# Patient Record
Sex: MALE | Race: ASIAN | NOT HISPANIC OR LATINO
[De-identification: names, ages, dates, MRNs, and addresses within clinical notes are randomized per-mention and may not be internally consistent; named-entity substitution may affect disease eponyms.]

---

## 2022-09-22 PROBLEM — Z00.00 ENCOUNTER FOR PREVENTIVE HEALTH EXAMINATION: Status: ACTIVE | Noted: 2022-09-22

## 2022-11-14 ENCOUNTER — APPOINTMENT (OUTPATIENT)
Dept: ORTHOPEDIC SURGERY | Facility: CLINIC | Age: 72
End: 2022-11-14

## 2022-11-14 VITALS — BODY MASS INDEX: 31.07 KG/M2 | WEIGHT: 205 LBS | HEIGHT: 68 IN

## 2022-11-14 DIAGNOSIS — Z86.39 PERSONAL HISTORY OF OTHER ENDOCRINE, NUTRITIONAL AND METABOLIC DISEASE: ICD-10-CM

## 2022-11-14 DIAGNOSIS — Z87.898 PERSONAL HISTORY OF OTHER SPECIFIED CONDITIONS: ICD-10-CM

## 2022-11-14 DIAGNOSIS — Z86.79 PERSONAL HISTORY OF OTHER DISEASES OF THE CIRCULATORY SYSTEM: ICD-10-CM

## 2022-11-14 DIAGNOSIS — Z87.891 PERSONAL HISTORY OF NICOTINE DEPENDENCE: ICD-10-CM

## 2022-11-14 DIAGNOSIS — Z95.5 PRESENCE OF CORONARY ANGIOPLASTY IMPLANT AND GRAFT: ICD-10-CM

## 2022-11-14 PROCEDURE — 20611 DRAIN/INJ JOINT/BURSA W/US: CPT | Mod: 50

## 2022-11-14 PROCEDURE — 99214 OFFICE O/P EST MOD 30 MIN: CPT | Mod: 25

## 2022-11-14 PROCEDURE — 73562 X-RAY EXAM OF KNEE 3: CPT | Mod: 50

## 2022-11-14 NOTE — ASSESSMENT
[FreeTextEntry1] : 9/15/20:Has advanced OA on the left knee and moderate OA on the left. Has had HA injections in the past with significant relief. Very active with home exercises at home- would prefer not to take oral medication. Patient elected to begin bilateral Orthovisc injections today. Tolerated well.\par 9/22/20: Inj tolerated well.\par 9/28/20: Inj tolerated well.\par 10/6/20: Inj tolerated well.\par \par 11/14/22: Will restart orthovisc injections as this has given him significant relief in the past. He will continue HEP as this has been beneficial for him.

## 2022-11-14 NOTE — PROCEDURE
[Large Joint Injection] : Large joint injection [Bilateral] : bilaterally of the [Knee] : knee [Pain] : pain [Inflammation] : inflammation [X-ray evidence of Osteoarthritis on this or prior visit] : x-ray evidence of Osteoarthritis on this or prior visit [Repeat series performed] : repeat series performed [Alcohol] : alcohol [Betadine] : betadine [Ethyl Chloride sprayed topically] : ethyl chloride sprayed topically [Sterile technique used] : sterile technique used [Orthovisc] : Orthovisc [] : Patient tolerated procedure well [Call if redness, pain or fever occur] : call if redness, pain or fever occur [Apply ice for 15min out of every hour for the next 12-24 hours as tolerated] : apply ice for 15 minutes out of every hour for the next 12-24 hours as tolerated [Patient was advised to rest the joint(s) for ____ days] : patient was advised to rest the joint(s) for [unfilled] days [Previous OTC use and PT nontherapeutic] : patient has tried OTC's including aspirin, Ibuprofen, Aleve, etc or prescription NSAIDS, and/or exercises at home and/or physical therapy without satisfactory response [Patient had decreased mobility in the joint] : patient had decreased mobility in the joint [Risks, benefits, alternatives discussed / Verbal consent obtained] : the risks benefits, and alternatives have been discussed, and verbal consent was obtained [All ultrasound images have been permanently captured and stored accordingly in our picture archiving and communication system] : All ultrasound images have been permanently captured and stored accordingly in our picture archiving and communication system [#1] : series #1

## 2022-11-14 NOTE — IMAGING
[Bilateral] : knee bilaterally [AP] : anteroposterior [Lateral] : lateral [[place]] : [place] [de-identified] : right knee:\par 0-130 \par varus \par non tenderness\par no crepitus\par no effusion \par 5/5\par NVI\par \par left knee:\par 0-125 \par varus \par moderate effusion \par mild medial joint line tenderness\par crepitus\par NVI\par 5/5 [FreeTextEntry9] : mod - adv OA

## 2022-11-14 NOTE — HISTORY OF PRESENT ILLNESS
[8] : 8 [5] : 5 [Retired] : Work status: retired [de-identified] : 11/14/22: Continued pain in b/l knees- he has gotten about a year relief from orthovisc injections.\par \par Previous doc:\par 9/15/20: Mr. Kilo Aaron, a 70-year-old male, presents today for many years of bilateral knee pain and clicking that has progressively worsened this past year. Right worse then left. Had cortisone and gel injections years ago with significant relief. Pain limits his daily activities. Takes NSAIDs.\par 9/22/20: Orthovisc #2 b/l knees.\par 9/28/20: Orthovisc #3 b/l knees, feeling better.\par 10/6/20: Orthovisc #4 b/l knees, improving.\par  [FreeTextEntry1] : B/L KNEES [de-identified] : ADVIL

## 2022-11-14 NOTE — DISCUSSION/SUMMARY
[de-identified] : The natural progression of Osteoarthritis was explained to the patient.  We discussed the possible treatment options from conservative to operative.  These included NSAIDS, Glucosamine and Chondrotin sulfate, and Physical Therapy as well different types of injections.  We also discussed that at some point they may progress to needed a TKA.  Information and pamphlets were given when appropriate.\par \par Entered by Sera Samayoa acting as scribe.\par

## 2022-11-29 ENCOUNTER — APPOINTMENT (OUTPATIENT)
Dept: ORTHOPEDIC SURGERY | Facility: CLINIC | Age: 72
End: 2022-11-29

## 2022-11-29 VITALS — WEIGHT: 205 LBS | BODY MASS INDEX: 31.07 KG/M2 | HEIGHT: 68 IN

## 2022-11-29 PROCEDURE — 20610 DRAIN/INJ JOINT/BURSA W/O US: CPT

## 2022-11-29 NOTE — IMAGING
[de-identified] : right knee:\par 0-130 \par varus \par non tenderness\par no crepitus\par no effusion \par 5/5\par NVI\par \par left knee:\par 0-125 \par varus \par moderate effusion \par mild medial joint line tenderness\par crepitus\par NVI\par 5/5

## 2022-11-29 NOTE — ASSESSMENT
[FreeTextEntry1] : 9/15/20:Has advanced OA on the left knee and moderate OA on the left. Has had HA injections in the past with significant relief. Very active with home exercises at home- would prefer not to take oral medication. Patient elected to begin bilateral Orthovisc injections today. Tolerated well.\par 9/22/20: Inj tolerated well.\par 9/28/20: Inj tolerated well.\par 10/6/20: Inj tolerated well.\par 11/14/22: Will restart orthovisc injections as this has given him significant relief in the past. He will continue HEP as this has been beneficial for him. \par \par 11/29/22: Inj tolerated well.

## 2022-11-29 NOTE — HISTORY OF PRESENT ILLNESS
[5] : 5 [Intermittent] : intermittent [2] : 2 [Orthovisc] : Orthovisc [de-identified] : 11/29/22: Orthovisc #2 b/l knees.\par \par Previous doc:\par 9/15/20: Mr. Kilo Aaron, a 70-year-old male, presents today for many years of bilateral knee pain and clicking that has progressively worsened this past year. Right worse then left. Had cortisone and gel injections years ago with significant relief. Pain limits his daily activities. Takes NSAIDs.\par 9/22/20: Orthovisc #2 b/l knees.\par 9/28/20: Orthovisc #3 b/l knees, feeling better.\par 10/6/20: Orthovisc #4 b/l knees, improving.\par 11/14/22: Continued pain in b/l knees- he has gotten about a year relief from orthovisc injections. [de-identified] : waqas knees [TWNoteComboBox1] : 10%

## 2022-12-06 ENCOUNTER — APPOINTMENT (OUTPATIENT)
Dept: ORTHOPEDIC SURGERY | Facility: CLINIC | Age: 72
End: 2022-12-06

## 2022-12-06 VITALS — HEIGHT: 68 IN | WEIGHT: 205 LBS | BODY MASS INDEX: 31.07 KG/M2

## 2022-12-06 PROCEDURE — 20610 DRAIN/INJ JOINT/BURSA W/O US: CPT | Mod: 50

## 2022-12-06 NOTE — HISTORY OF PRESENT ILLNESS
[5] : 5 [2] : 2 [Dull/Aching] : dull/aching [Constant] : constant [Orthovisc] : Orthovisc [de-identified] : 12/6/22: Orthovisc #3 b/l knees.\par \par Previous doc:\par 9/15/20: Mr. Kilo Aaron, a 70-year-old male, presents today for many years of bilateral knee pain and clicking that has progressively worsened this past year. Right worse then left. Had cortisone and gel injections years ago with significant relief. Pain limits his daily activities. Takes NSAIDs.\par 9/22/20: Orthovisc #2 b/l knees.\par 9/28/20: Orthovisc #3 b/l knees, feeling better.\par 10/6/20: Orthovisc #4 b/l knees, improving.\par 11/14/22: Continued pain in b/l knees- he has gotten about a year relief from orthovisc injections.\par 11/29/22: Orthovisc #2 b/l knees.\par  [] : no [FreeTextEntry1] : DORON knees [FreeTextEntry5] : ANA LUISA DURÁN is a 72 year old M here for INJ #3 of Orthovisc for DORON knee pain.  [de-identified] : 11/29/22 [de-identified] : DORON knees [TWNoteComboBox1] : 50%

## 2022-12-06 NOTE — PROCEDURE
[Large Joint Injection] : Large joint injection [Bilateral] : bilaterally of the [Knee] : knee [Pain] : pain [Inflammation] : inflammation [X-ray evidence of Osteoarthritis on this or prior visit] : x-ray evidence of Osteoarthritis on this or prior visit [Repeat series performed] : repeat series performed [Alcohol] : alcohol [Betadine] : betadine [Ethyl Chloride sprayed topically] : ethyl chloride sprayed topically [Sterile technique used] : sterile technique used [Orthovisc (30mg)] : 30mg of Orthovisc [#3] : series #3 [] : Patient tolerated procedure well [Call if redness, pain or fever occur] : call if redness, pain or fever occur [Apply ice for 15min out of every hour for the next 12-24 hours as tolerated] : apply ice for 15 minutes out of every hour for the next 12-24 hours as tolerated [Previous OTC use and PT nontherapeutic] : patient has tried OTC's including aspirin, Ibuprofen, Aleve, etc or prescription NSAIDS, and/or exercises at home and/or physical therapy without satisfactory response [Patient had decreased mobility in the joint] : patient had decreased mobility in the joint [Risks, benefits, alternatives discussed / Verbal consent obtained] : the risks benefits, and alternatives have been discussed, and verbal consent was obtained [All ultrasound images have been permanently captured and stored accordingly in our picture archiving and communication system] : All ultrasound images have been permanently captured and stored accordingly in our picture archiving and communication system [Visualization of the needle and placement of injection was performed without complication] : visualization of the needle and placement of injection was performed without complication

## 2022-12-06 NOTE — IMAGING
[de-identified] : right knee:\par 0-130 \par varus \par non tenderness\par no crepitus\par no effusion \par 5/5\par NVI\par \par left knee:\par 0-125 \par varus \par moderate effusion \par mild medial joint line tenderness\par crepitus\par NVI\par 5/5

## 2022-12-06 NOTE — ASSESSMENT
[FreeTextEntry1] : 9/15/20:Has advanced OA on the left knee and moderate OA on the left. Has had HA injections in the past with significant relief. Very active with home exercises at home- would prefer not to take oral medication. Patient elected to begin bilateral Orthovisc injections today. Tolerated well.\par 9/22/20: Inj tolerated well.\par 9/28/20: Inj tolerated well.\par 10/6/20: Inj tolerated well.\par 11/14/22: Will restart orthovisc injections as this has given him significant relief in the past. He will continue HEP as this has been beneficial for him. \par 11/29/22: Inj tolerated well.\par \par 12/6/22: Inj tolerated well.

## 2022-12-16 ENCOUNTER — APPOINTMENT (OUTPATIENT)
Dept: ORTHOPEDIC SURGERY | Facility: CLINIC | Age: 72
End: 2022-12-16
Payer: MEDICARE

## 2022-12-16 VITALS — BODY MASS INDEX: 31.22 KG/M2 | WEIGHT: 206 LBS | HEIGHT: 68 IN

## 2022-12-16 PROCEDURE — 20610 DRAIN/INJ JOINT/BURSA W/O US: CPT | Mod: 50

## 2022-12-16 NOTE — ASSESSMENT
[FreeTextEntry1] : 9/15/20:Has advanced OA on the left knee and moderate OA on the left. Has had HA injections in the past with significant relief. Very active with home exercises at home- would prefer not to take oral medication. Patient elected to begin bilateral Orthovisc injections today. Tolerated well.\par 9/22/20: Inj tolerated well.\par 9/28/20: Inj tolerated well.\par 10/6/20: Inj tolerated well.\par 11/14/22: Will restart orthovisc injections as this has given him significant relief in the past. He will continue HEP as this has been beneficial for him. \par 11/29/22: Inj tolerated well.\par 12/6/22: Inj tolerated well. \par \par 12/16/22: Inj tolerated well.

## 2022-12-16 NOTE — HISTORY OF PRESENT ILLNESS
[4] : 4 [Orthovisc] : Orthovisc [de-identified] : 12/16/22: Orthovisc #4 b/l knees.\par \par Previous doc:\par 9/15/20: Mr. Kilo Aaron, a 70-year-old male, presents today for many years of bilateral knee pain and clicking that has progressively worsened this past year. Right worse then left. Had cortisone and gel injections years ago with significant relief. Pain limits his daily activities. Takes NSAIDs.\par 9/22/20: Orthovisc #2 b/l knees.\par 9/28/20: Orthovisc #3 b/l knees, feeling better.\par 10/6/20: Orthovisc #4 b/l knees, improving.\par 11/14/22: Continued pain in b/l knees- he has gotten about a year relief from orthovisc injections.\par 11/29/22: Orthovisc #2 b/l knees.\par 12/6/22: Orthovisc #3 b/l knees. [de-identified] : 12/6/22 [de-identified] : waqas knees  [de-identified] : Orthovisc

## 2022-12-16 NOTE — PROCEDURE
[Large Joint Injection] : Large joint injection [Bilateral] : bilaterally of the [Knee] : knee [Pain] : pain [Inflammation] : inflammation [X-ray evidence of Osteoarthritis on this or prior visit] : x-ray evidence of Osteoarthritis on this or prior visit [Repeat series performed] : repeat series performed [Alcohol] : alcohol [Betadine] : betadine [Ethyl Chloride sprayed topically] : ethyl chloride sprayed topically [Sterile technique used] : sterile technique used [Orthovisc (30mg)] : 30mg of Orthovisc [#4] : series #4 [] : Patient tolerated procedure well [Call if redness, pain or fever occur] : call if redness, pain or fever occur [Apply ice for 15min out of every hour for the next 12-24 hours as tolerated] : apply ice for 15 minutes out of every hour for the next 12-24 hours as tolerated [Previous OTC use and PT nontherapeutic] : patient has tried OTC's including aspirin, Ibuprofen, Aleve, etc or prescription NSAIDS, and/or exercises at home and/or physical therapy without satisfactory response [Patient had decreased mobility in the joint] : patient had decreased mobility in the joint [Risks, benefits, alternatives discussed / Verbal consent obtained] : the risks benefits, and alternatives have been discussed, and verbal consent was obtained [All ultrasound images have been permanently captured and stored accordingly in our picture archiving and communication system] : All ultrasound images have been permanently captured and stored accordingly in our picture archiving and communication system [Visualization of the needle and placement of injection was performed without complication] : visualization of the needle and placement of injection was performed without complication

## 2022-12-16 NOTE — IMAGING
[de-identified] : right knee:\par 0-130 \par varus \par non tenderness\par no crepitus\par no effusion \par 5/5\par NVI\par \par left knee:\par 0-125 \par varus \par moderate effusion \par mild medial joint line tenderness\par crepitus\par NVI\par 5/5

## 2023-05-23 ENCOUNTER — APPOINTMENT (OUTPATIENT)
Dept: ORTHOPEDIC SURGERY | Facility: CLINIC | Age: 73
End: 2023-05-23
Payer: MEDICARE

## 2023-05-23 VITALS — HEIGHT: 68 IN | WEIGHT: 208 LBS | BODY MASS INDEX: 31.52 KG/M2

## 2023-05-23 PROCEDURE — 99214 OFFICE O/P EST MOD 30 MIN: CPT | Mod: 25

## 2023-05-23 PROCEDURE — 20611 DRAIN/INJ JOINT/BURSA W/US: CPT | Mod: 50

## 2023-06-02 NOTE — DISCUSSION/SUMMARY
[de-identified] : The patient was advised of the diagnosis.  The natural history of the pathology was explained in full to the patient in layman's terms. All questions were answered.  The risks and benefits of surgical and non-surgical treatment alternatives were explained in full to the patient.\par \par The natural progression of Osteoarthritis was explained to the patient.  We discussed the possible treatment options from conservative to operative.  These included NSAIDS, Glucosamine and Chondrotin sulfate, and Physical Therapy as well different types of injections.  We also discussed that at some point they may progress to needed a TKA.  Information and pamphlets were given.\par \par The risks, benefits, contents and alternatives to injection were explained in full to the patient.  Risks outlined include but are not limited to infection, sepsis, bleeding, scarring, skin discoloration, temporary increase in pain, syncopal episode, failure to resolve symptoms, allergic reaction, flare reaction, permanent white skin discoloration, symptom recurrence, and elevation of blood sugar in diabetics.  Patient understood the risks.  All questions were answered.  After discussion of options, patient requested an injection.  Oral informed consent was obtained and sterile prep was done of the injection site.  Sterile technique was used to introduce the mixture.  Patient tolerated the procedure well.  Patient advised to ice the injection site this evening.  Signs and symptoms of infection reviewed and patient advised to call immediately for redness, fevers, and/or chills. \par \par Progress note completed by Ashley Araujo PA-C

## 2023-06-02 NOTE — IMAGING
[de-identified] : right knee:\par 0-130 \par varus \par non tenderness\par no crepitus\par no effusion \par 5/5\par NVI\par \par left knee:\par 0-125 \par varus \par moderate effusion \par mild medial joint line tenderness\par crepitus\par NVI\par 5/5\par ambulates with cane

## 2023-06-02 NOTE — ASSESSMENT
[FreeTextEntry1] : 9/15/20:Has advanced OA on the left knee and moderate OA on the left. Has had HA injections in the past with significant relief. Very active with home exercises at home- would prefer not to take oral medication. Patient elected to begin bilateral Orthovisc injections today. Tolerated well.\par 9/22/20: Inj tolerated well.\par 9/28/20: Inj tolerated well.\par 10/6/20: Inj tolerated well.\par 11/14/22: Will restart orthovisc injections as this has given him significant relief in the past. He will continue HEP as this has been beneficial for him. \par 11/29/22: Inj tolerated well.\par 12/6/22: Inj tolerated well. \par 12/16/22: Inj tolerated well.\par \par 5/23/23: Zilretta bilateral knees today, tolerated well. 10cc yellow clear fluid aspirated R knee. f/up in 6 weeks, consider repeating visco inj vs. TKA discussion.

## 2023-06-02 NOTE — HISTORY OF PRESENT ILLNESS
[8] : 8 [6] : 6 [Dull/Aching] : dull/aching [de-identified] : 5/23/23: Here for f/up bilateral knee. Visco series finished at last visit had been beneficial for a few months. R knee pain and stiffness worsened last month\par \par Previous doc:\par 9/15/20: Mr. Kilo Aaron, a 70-year-old male, presents today for many years of bilateral knee pain and clicking that has progressively worsened this past year. Right worse then left. Had cortisone and gel injections years ago with significant relief. Pain limits his daily activities. Takes NSAIDs.\par 9/22/20: Orthovisc #2 b/l knees.\par 9/28/20: Orthovisc #3 b/l knees, feeling better.\par 10/6/20: Orthovisc #4 b/l knees, improving.\par 11/14/22: Continued pain in b/l knees- he has gotten about a year relief from orthovisc injections.\par 11/29/22: Orthovisc #2 b/l knees.\par 12/6/22: Orthovisc #3 b/l knees.\par 12/16/22: Orthovisc #4 b/l knees.

## 2023-06-02 NOTE — PROCEDURE
[Large Joint Injection] : Large joint injection [Bilateral] : bilaterally of the [Knee] : knee [Pain] : pain [Inflammation] : inflammation [X-ray evidence of Osteoarthritis on this or prior visit] : x-ray evidence of Osteoarthritis on this or prior visit [Alcohol] : alcohol [Betadine] : betadine [Ethyl Chloride sprayed topically] : ethyl chloride sprayed topically [Sterile technique used] : sterile technique used [___ cc    1%] : Lidocaine ~Vcc of 1%  [___ cc    32 units 5mg] : Zilretta ~Vcc of 32 units 5 mg  [Effusion] : effusion [] : Patient tolerated procedure well [Call if redness, pain or fever occur] : call if redness, pain or fever occur [Apply ice for 15min out of every hour for the next 12-24 hours as tolerated] : apply ice for 15 minutes out of every hour for the next 12-24 hours as tolerated [Patient was advised to rest the joint(s) for ____ days] : patient was advised to rest the joint(s) for [unfilled] days [Previous OTC use and PT nontherapeutic] : patient has tried OTC's including aspirin, Ibuprofen, Aleve, etc or prescription NSAIDS, and/or exercises at home and/or physical therapy without satisfactory response [Risks, benefits, alternatives discussed / Verbal consent obtained] : the risks benefits, and alternatives have been discussed, and verbal consent was obtained [Prior failure or difficult injection] : prior failure or difficult injection [All ultrasound images have been permanently captured and stored accordingly in our picture archiving and communication system] : All ultrasound images have been permanently captured and stored accordingly in our picture archiving and communication system [Visualization of the needle and placement of injection was performed without complication] : visualization of the needle and placement of injection was performed without complication [de-identified] : 10cc R knee [de-identified] : clear yellow

## 2023-06-27 ENCOUNTER — APPOINTMENT (OUTPATIENT)
Dept: ORTHOPEDIC SURGERY | Facility: CLINIC | Age: 73
End: 2023-06-27
Payer: MEDICARE

## 2023-06-27 VITALS — HEIGHT: 68 IN | WEIGHT: 208 LBS | BODY MASS INDEX: 31.52 KG/M2

## 2023-06-27 PROCEDURE — 99213 OFFICE O/P EST LOW 20 MIN: CPT

## 2023-06-27 NOTE — DISCUSSION/SUMMARY
[de-identified] : The patient was advised of the diagnosis.  The natural history of the pathology was explained in full to the patient in layman's terms. All questions were answered.  The risks and benefits of surgical and non-surgical treatment alternatives were explained in full to the patient.\par \par The natural progression of Osteoarthritis was explained to the patient.  We discussed the possible treatment options from conservative to operative.  These included NSAIDS, Glucosamine and Chondrotin sulfate, and Physical Therapy as well different types of injections.  We also discussed that at some point they may progress to needed a TKA.  Information and pamphlets were given.\par \par The risks, benefits, contents and alternatives to injection were explained in full to the patient.  Risks outlined include but are not limited to infection, sepsis, bleeding, scarring, skin discoloration, temporary increase in pain, syncopal episode, failure to resolve symptoms, allergic reaction, flare reaction, permanent white skin discoloration, symptom recurrence, and elevation of blood sugar in diabetics.  Patient understood the risks.  All questions were answered.  After discussion of options, patient requested an injection.  Oral informed consent was obtained and sterile prep was done of the injection site.  Sterile technique was used to introduce the mixture.  Patient tolerated the procedure well.  Patient advised to ice the injection site this evening.  Signs and symptoms of infection reviewed and patient advised to call immediately for redness, fevers, and/or chills. \par \par Progress note completed by Ashley Araujo PA-C

## 2023-06-27 NOTE — HISTORY OF PRESENT ILLNESS
[8] : 8 [6] : 6 [Dull/Aching] : dull/aching [de-identified] : 6/27/23 inj helped a lot - sig improvement - still has some pain - hesitant to do PT due to pain  \par \par Previous doc:\par 9/15/20: Mr. Kilo Aaron, a 70-year-old male, presents today for many years of bilateral knee pain and clicking that has progressively worsened this past year. Right worse then left. Had cortisone and gel injections years ago with significant relief. Pain limits his daily activities. Takes NSAIDs.\par 9/22/20: Orthovisc #2 b/l knees.\par 9/28/20: Orthovisc #3 b/l knees, feeling better.\par 10/6/20: Orthovisc #4 b/l knees, improving.\par 11/14/22: Continued pain in b/l knees- he has gotten about a year relief from orthovisc injections.\par 11/29/22: Orthovisc #2 b/l knees.\par 12/6/22: Orthovisc #3 b/l knees.\par 12/16/22: Orthovisc #4 b/l knees.\par 5/23/23: Here for f/up bilateral knee. Visco series finished at last visit had been beneficial for a few months. R knee pain and stiffness worsened last month\par  [FreeTextEntry5] : Kilo is a 73 year M, here for a FU on B/L knee. Little achy. Had a CSI Injection.

## 2023-06-27 NOTE — IMAGING
[de-identified] : right knee:\par 0-130 \par varus \par non tenderness\par no crepitus\par no effusion \par 5/5\par NVI\par \par left knee:\par 0-125 \par varus \par no effusion \par mild medial joint line tenderness\par crepitus\par NVI\par 5/5\par ambulates with cane

## 2023-06-27 NOTE — ASSESSMENT
[FreeTextEntry1] : 9/15/20:Has advanced OA on the left knee and moderate OA on the left. Has had HA injections in the past with significant relief. Very active with home exercises at home- would prefer not to take oral medication. Patient elected to begin bilateral Orthovisc injections today. Tolerated well.\par 9/22/20: Inj tolerated well.\par 9/28/20: Inj tolerated well.\par 10/6/20: Inj tolerated well.\par 11/14/22: Will restart orthovisc injections as this has given him significant relief in the past. He will continue HEP as this has been beneficial for him. \par 11/29/22: Inj tolerated well.\par 12/6/22: Inj tolerated well. \par 12/16/22: Inj tolerated well.\par 5/23/23: Zilretta bilateral knees today, tolerated well. 10cc yellow clear fluid aspirated R knee. f/up in 6 weeks, consider repeating visco inj vs. TKA discussion. \par \par 6/27 doing well min pain -happy with inj -

## 2023-06-27 NOTE — PROCEDURE
[Large Joint Injection] : Large joint injection [Bilateral] : bilaterally of the [Knee] : knee [Pain] : pain [Inflammation] : inflammation [X-ray evidence of Osteoarthritis on this or prior visit] : x-ray evidence of Osteoarthritis on this or prior visit [Alcohol] : alcohol [Betadine] : betadine [Ethyl Chloride sprayed topically] : ethyl chloride sprayed topically [Sterile technique used] : sterile technique used [___ cc    1%] : Lidocaine ~Vcc of 1%  [___ cc    32 units 5mg] : Zilretta ~Vcc of 32 units 5 mg  [Effusion] : effusion [] : Patient tolerated procedure well [Call if redness, pain or fever occur] : call if redness, pain or fever occur [Apply ice for 15min out of every hour for the next 12-24 hours as tolerated] : apply ice for 15 minutes out of every hour for the next 12-24 hours as tolerated [Patient was advised to rest the joint(s) for ____ days] : patient was advised to rest the joint(s) for [unfilled] days [Previous OTC use and PT nontherapeutic] : patient has tried OTC's including aspirin, Ibuprofen, Aleve, etc or prescription NSAIDS, and/or exercises at home and/or physical therapy without satisfactory response [Risks, benefits, alternatives discussed / Verbal consent obtained] : the risks benefits, and alternatives have been discussed, and verbal consent was obtained [Prior failure or difficult injection] : prior failure or difficult injection [All ultrasound images have been permanently captured and stored accordingly in our picture archiving and communication system] : All ultrasound images have been permanently captured and stored accordingly in our picture archiving and communication system [Visualization of the needle and placement of injection was performed without complication] : visualization of the needle and placement of injection was performed without complication [de-identified] : 10cc R knee [de-identified] : clear yellow

## 2023-09-05 ENCOUNTER — APPOINTMENT (OUTPATIENT)
Dept: ORTHOPEDIC SURGERY | Facility: CLINIC | Age: 73
End: 2023-09-05
Payer: MEDICARE

## 2023-09-05 VITALS — BODY MASS INDEX: 30.62 KG/M2 | HEIGHT: 68 IN | WEIGHT: 202 LBS

## 2023-09-05 PROCEDURE — 99214 OFFICE O/P EST MOD 30 MIN: CPT | Mod: 25

## 2023-09-05 PROCEDURE — 20611 DRAIN/INJ JOINT/BURSA W/US: CPT | Mod: RT

## 2023-09-05 NOTE — ASSESSMENT
[FreeTextEntry1] : 9/15/20:Has advanced OA on the left knee and moderate OA on the left. Has had HA injections in the past with significant relief. Very active with home exercises at home- would prefer not to take oral medication. Patient elected to begin bilateral Orthovisc injections today. Tolerated well. 9/22/20: Inj tolerated well. 9/28/20: Inj tolerated well. 10/6/20: Inj tolerated well. 11/14/22: Will restart orthovisc injections as this has given him significant relief in the past. He will continue HEP as this has been beneficial for him.  11/29/22: Inj tolerated well. 12/6/22: Inj tolerated well.  12/16/22: Inj tolerated well. 5/23/23: Zilretta bilateral knees today, tolerated well. 10cc yellow clear fluid aspirated R knee. f/up in 6 weeks, consider repeating visco inj vs. TKA discussion.  6/27 doing well min pain -happy with inj -   9/5/23: Repeat Zilretta R knee today, tolerated well. f/up in 3 months, consider repeat Zilretta vs. repeat Orthovisc series.

## 2023-09-05 NOTE — IMAGING
[de-identified] : right knee:\par  0-130 \par  varus \par  non tenderness\par  no crepitus\par  no effusion \par  5/5\par  NVI\par  \par  left knee:\par  0-125 \par  varus \par  no effusion \par  mild medial joint line tenderness\par  crepitus\par  NVI\par  5/5\par  ambulates with cane

## 2023-09-05 NOTE — DISCUSSION/SUMMARY
[de-identified] : The patient was advised of the diagnosis.  The natural history of the pathology was explained in full to the patient in layman's terms. All questions were answered.  The risks and benefits of surgical and non-surgical treatment alternatives were explained in full to the patient.\par  \par  The natural progression of Osteoarthritis was explained to the patient.  We discussed the possible treatment options from conservative to operative.  These included NSAIDS, Glucosamine and Chondrotin sulfate, and Physical Therapy as well different types of injections.  We also discussed that at some point they may progress to needed a TKA.  Information and pamphlets were given.\par  \par  The risks, benefits, contents and alternatives to injection were explained in full to the patient.  Risks outlined include but are not limited to infection, sepsis, bleeding, scarring, skin discoloration, temporary increase in pain, syncopal episode, failure to resolve symptoms, allergic reaction, flare reaction, permanent white skin discoloration, symptom recurrence, and elevation of blood sugar in diabetics.  Patient understood the risks.  All questions were answered.  After discussion of options, patient requested an injection.  Oral informed consent was obtained and sterile prep was done of the injection site.  Sterile technique was used to introduce the mixture.  Patient tolerated the procedure well.  Patient advised to ice the injection site this evening.  Signs and symptoms of infection reviewed and patient advised to call immediately for redness, fevers, and/or chills. \par  \par  Progress note completed by Ashley Araujo PA-C

## 2023-09-05 NOTE — HISTORY OF PRESENT ILLNESS
[Dull/Aching] : dull/aching [de-identified] : 9/5/23: Here for f/up bilateral knees. States pain has been gradually returning. Right> left today.   Previous doc: 9/15/20: Mr. Kilo Aaron, a 70-year-old male, presents today for many years of bilateral knee pain and clicking that has progressively worsened this past year. Right worse then left. Had cortisone and gel injections years ago with significant relief. Pain limits his daily activities. Takes NSAIDs. 9/22/20: Orthovisc #2 b/l knees. 9/28/20: Orthovisc #3 b/l knees, feeling better. 10/6/20: Orthovisc #4 b/l knees, improving. 11/14/22: Continued pain in b/l knees- he has gotten about a year relief from orthovisc injections. 11/29/22: Orthovisc #2 b/l knees. 12/6/22: Orthovisc #3 b/l knees. 12/16/22: Orthovisc #4 b/l knees. 5/23/23: Here for f/up bilateral knee. Visco series finished at last visit had been beneficial for a few months. R knee pain and stiffness worsened last month 6/27/23 inj helped a lot - sig improvement - still has some pain - hesitant to do PT due to pain   [FreeTextEntry5] : left knee pain still right knee pain worsen  [de-identified] : had CSI

## 2023-09-05 NOTE — PROCEDURE
[Large Joint Injection] : Large joint injection [Right] : of the right [Knee] : knee [Pain] : pain [Inflammation] : inflammation [X-ray evidence of Osteoarthritis on this or prior visit] : x-ray evidence of Osteoarthritis on this or prior visit [Alcohol] : alcohol [Betadine] : betadine [Ethyl Chloride sprayed topically] : ethyl chloride sprayed topically [Sterile technique used] : sterile technique used [___ cc    1%] : Lidocaine ~Vcc of 1%  [___ cc    32 units 5mg] : Zilretta ~Vcc of 32 units 5 mg  [Effusion] : effusion [] : Patient tolerated procedure well [Call if redness, pain or fever occur] : call if redness, pain or fever occur [Apply ice for 15min out of every hour for the next 12-24 hours as tolerated] : apply ice for 15 minutes out of every hour for the next 12-24 hours as tolerated [Patient was advised to rest the joint(s) for ____ days] : patient was advised to rest the joint(s) for [unfilled] days [Previous OTC use and PT nontherapeutic] : patient has tried OTC's including aspirin, Ibuprofen, Aleve, etc or prescription NSAIDS, and/or exercises at home and/or physical therapy without satisfactory response [Patient had decreased mobility in the joint] : patient had decreased mobility in the joint [Risks, benefits, alternatives discussed / Verbal consent obtained] : the risks benefits, and alternatives have been discussed, and verbal consent was obtained [Prior failure or difficult injection] : prior failure or difficult injection [All ultrasound images have been permanently captured and stored accordingly in our picture archiving and communication system] : All ultrasound images have been permanently captured and stored accordingly in our picture archiving and communication system [Visualization of the needle and placement of injection was performed without complication] : visualization of the needle and placement of injection was performed without complication [Effusion or other fluid collection] : effusion or other fluid collection [de-identified] : 10cc [de-identified] : clear yellow

## 2023-11-28 ENCOUNTER — APPOINTMENT (OUTPATIENT)
Dept: ORTHOPEDIC SURGERY | Facility: CLINIC | Age: 73
End: 2023-11-28
Payer: MEDICARE

## 2023-11-28 VITALS — WEIGHT: 202 LBS | HEIGHT: 68 IN | BODY MASS INDEX: 30.62 KG/M2

## 2023-11-28 DIAGNOSIS — S93.402D SPRAIN OF UNSPECIFIED LIGAMENT OF LEFT ANKLE, SUBSEQUENT ENCOUNTER: ICD-10-CM

## 2023-11-28 DIAGNOSIS — S93.402A SPRAIN OF UNSPECIFIED LIGAMENT OF LEFT ANKLE, INITIAL ENCOUNTER: ICD-10-CM

## 2023-11-28 DIAGNOSIS — S93.492S SPRAIN OF OTHER LIGAMENT OF LEFT ANKLE, SEQUELA: ICD-10-CM

## 2023-11-28 PROCEDURE — 20610 DRAIN/INJ JOINT/BURSA W/O US: CPT | Mod: LT

## 2023-11-28 PROCEDURE — 73562 X-RAY EXAM OF KNEE 3: CPT | Mod: 50

## 2023-11-28 PROCEDURE — 99214 OFFICE O/P EST MOD 30 MIN: CPT | Mod: 25

## 2023-12-12 ENCOUNTER — APPOINTMENT (OUTPATIENT)
Dept: ORTHOPEDIC SURGERY | Facility: CLINIC | Age: 73
End: 2023-12-12

## 2024-01-02 ENCOUNTER — APPOINTMENT (OUTPATIENT)
Dept: ORTHOPEDIC SURGERY | Facility: CLINIC | Age: 74
End: 2024-01-02
Payer: MEDICARE

## 2024-01-02 PROCEDURE — 20610 DRAIN/INJ JOINT/BURSA W/O US: CPT | Mod: RT

## 2024-01-02 PROCEDURE — 99214 OFFICE O/P EST MOD 30 MIN: CPT | Mod: 25

## 2024-01-02 NOTE — ASSESSMENT
[FreeTextEntry1] : Previous doc: 9/15/20:Has advanced OA on the left knee and moderate OA on the left. Has had HA injections in the past with significant relief. Very active with home exercises at home- would prefer not to take oral medication. Patient elected to begin bilateral Orthovisc injections today. Tolerated well. 9/22/20: Inj tolerated well. 9/28/20: Inj tolerated well. 10/6/20: Inj tolerated well. 11/14/22: Will restart orthovisc injections as this has given him significant relief in the past. He will continue HEP as this has been beneficial for him. 11/29/22: Inj tolerated well. 12/6/22: Inj tolerated well. 12/16/22: Inj tolerated well. 5/23/23: Zilretta bilateral knees today, tolerated well. 10cc yellow clear fluid aspirated R knee. f/up in 6 weeks, consider repeating visco inj vs. TKA discussion. 6/27 doing well min pain -happy with inj - 9/5/23: Repeat Zilretta R knee today, tolerated well. f/up in 3 months, consider repeat Zilretta vs. repeat Orthovisc series. 11/28/23: Adv OA b/l knees. Had RT knee Zilretta inj on 9/5 which provided good relief- too soon to repeat Zilretta in this knee today. Will do LT knee Zilretta today- follow up 2 weeks to do right knee. Given ankle brace today. Follow up 2 weeks.   1/2/24: Left knee doing well since zilretta, will try this for right knee today.

## 2024-01-02 NOTE — IMAGING
[de-identified] : right knee: 0-130  varus  non tenderness no crepitus no effusion  5/5 NVI  left knee: 0-125  varus  no effusion  mild medial joint line tenderness crepitus NVI 5/5 ambulates with cane

## 2024-01-02 NOTE — PROCEDURE
[Large Joint Injection] : Large joint injection [Right] : of the right [Knee] : knee [Pain] : pain [Inflammation] : inflammation [X-ray evidence of Osteoarthritis on this or prior visit] : x-ray evidence of Osteoarthritis on this or prior visit [Alcohol] : alcohol [Betadine] : betadine [Ethyl Chloride sprayed topically] : ethyl chloride sprayed topically [Sterile technique used] : sterile technique used [___ cc    32 units 5mg] : Zilretta ~Vcc of 32 units 5 mg  [Call if redness, pain or fever occur] : call if redness, pain or fever occur [Apply ice for 15min out of every hour for the next 12-24 hours as tolerated] : apply ice for 15 minutes out of every hour for the next 12-24 hours as tolerated [Previous OTC use and PT nontherapeutic] : patient has tried OTC's including aspirin, Ibuprofen, Aleve, etc or prescription NSAIDS, and/or exercises at home and/or physical therapy without satisfactory response [Patient had decreased mobility in the joint] : patient had decreased mobility in the joint [Risks, benefits, alternatives discussed / Verbal consent obtained] : the risks benefits, and alternatives have been discussed, and verbal consent was obtained [All ultrasound images have been permanently captured and stored accordingly in our picture archiving and communication system] : All ultrasound images have been permanently captured and stored accordingly in our picture archiving and communication system [Visualization of the needle and placement of injection was performed without complication] : visualization of the needle and placement of injection was performed without complication

## 2024-01-02 NOTE — DISCUSSION/SUMMARY
[de-identified] : The natural progression of Osteoarthritis was explained to the patient.  We discussed the possible treatment options from conservative to operative.  These included NSAIDS, Glucosamine and Chondrotin sulfate, Physical Therapy and injections.  We also discussed that at some point they may progress to needing a TKA.

## 2024-01-02 NOTE — HISTORY OF PRESENT ILLNESS
[9] : 9 [Dull/Aching] : dull/aching [Throbbing] : throbbing [de-identified] : 1/2/24: FU B/L Knees- left knee is better since zilretta, right knee is worsening.  Previous doc: 9/15/20: Mr. Kilo Aaron, a 70-year-old male, presents today for many years of bilateral knee pain and clicking that has progressively worsened this past year. Right worse then left. Had cortisone and gel injections years ago with significant relief. Pain limits his daily activities. Takes NSAIDs. 9/22/20: Orthovisc #2 b/l knees. 9/28/20: Orthovisc #3 b/l knees, feeling better. 10/6/20: Orthovisc #4 b/l knees, improving. 11/14/22: Continued pain in b/l knees- he has gotten about a year relief from orthovisc injections. 11/29/22: Orthovisc #2 b/l knees. 12/6/22: Orthovisc #3 b/l knees. 12/16/22: Orthovisc #4 b/l knees. 5/23/23: Here for f/up bilateral knee. Visco series finished at last visit had been beneficial for a few months. R knee pain and stiffness worsened last month 6/27/23 inj helped a lot - sig improvement - still has some pain - hesitant to do PT due to pain   9/5/23: Here for f/up bilateral knees. States pain has been gradually returning. Right> left today.  11/28/23: Here to f/up b/l knees (R>L). Zilretta in RT knee at last visit provided good relief for 2 months. Reports his LT ankle is swollen. Feels his gait is altered. Ambulates without assistance. Advil occ with mild relief.  [] : no [FreeTextEntry5] : Patient is here for FU on DORON knee. Pain increasing. Swelling down the ankle.

## 2024-04-30 ENCOUNTER — APPOINTMENT (OUTPATIENT)
Dept: ORTHOPEDIC SURGERY | Facility: CLINIC | Age: 74
End: 2024-04-30
Payer: MEDICARE

## 2024-04-30 VITALS — BODY MASS INDEX: 29.86 KG/M2 | WEIGHT: 197 LBS | HEIGHT: 68 IN

## 2024-04-30 PROCEDURE — 99214 OFFICE O/P EST MOD 30 MIN: CPT | Mod: 25

## 2024-04-30 PROCEDURE — 20610 DRAIN/INJ JOINT/BURSA W/O US: CPT | Mod: RT

## 2024-04-30 NOTE — ASSESSMENT
[FreeTextEntry1] : 9/15/20:Has advanced OA on the left knee and moderate OA on the left. Has had HA injections in the past with significant relief. Very active with home exercises at home- would prefer not to take oral medication. Patient elected to begin bilateral Orthovisc injections today. Tolerated well. 9/22/20: Inj tolerated well. 9/28/20: Inj tolerated well. 10/6/20: Inj tolerated well. 11/14/22: Will restart orthovisc injections as this has given him significant relief in the past. He will continue HEP as this has been beneficial for him. 11/29/22: Inj tolerated well. 12/6/22: Inj tolerated well. 12/16/22: Inj tolerated well. 5/23/23: Zilretta bilateral knees today, tolerated well. 10cc yellow clear fluid aspirated R knee. f/up in 6 weeks, consider repeating visco inj vs. TKA discussion. 6/27 doing well min pain -happy with inj - 9/5/23: Repeat Zilretta R knee today, tolerated well. f/up in 3 months, consider repeat Zilretta vs. repeat Orthovisc series. 11/28/23: Adv OA b/l knees. Had RT knee Zilretta inj on 9/5 which provided good relief- too soon to repeat Zilretta in this knee today. Will do LT knee Zilretta today- follow up 2 weeks to do right knee. Given ankle brace today. Follow up 2 weeks.  1/2/24: Left knee doing well since zilretta, will try this for right knee today.  4/30/24: Adv OA b/l knees (L=R). He has had good relief in the past with HA series- he is not quite ready for TKA and would like to avoid multiple steroid inj. Will begin Orthovisc series for b/l knees- injection #1 done today. F/up 1 week to continue series.

## 2024-04-30 NOTE — IMAGING
[de-identified] : right knee: 0-130  varus  non tenderness no crepitus no effusion  +1 edema 5/5 NVI  left knee: 0-125  varus  no effusion  mild medial joint line tenderness crepitus NVI +3 edema- sees vascular specialist- states he had a vein taken out 5/5 ambulates with cane  LEFT ANKLE NVI +2 edema tender at ATFL and deltoid ligaments 5/5 strength

## 2024-04-30 NOTE — DISCUSSION/SUMMARY
[de-identified] : The natural progression of Osteoarthritis was explained to the patient.  We discussed the possible treatment options from conservative to operative.  These included NSAIDS, Glucosamine and Chondrotin sulfate, and Physical Therapy as well different types of injections.  We also discussed that at some point they may progress to needed a TKA.  Information and pamphlets were given when appropriate.  The risks, benefits, contents and alternatives to injection were explained in full to the patient.  Risks outlined include but are not limited to infection, sepsis, bleeding, scarring, skin discoloration, temporary increase in pain, syncopal episode, failure to resolve symptoms, allergic reaction, flare reaction, permanent white skin discoloration, symptom recurrence, and elevation of blood sugar in diabetics.  Patient understood the risks.  All questions were answered.  After discussion of options, patient requested an injection.  Oral informed consent was obtained and sterile prep was done of the injection site.  Sterile technique was used to introduce the mixture.  Patient tolerated the procedure well.  Patient advised to ice the injection site this evening.  Signs and symptoms of infection reviewed and patient advised to call immediately for redness, fevers, and/or chills.  Entered by Shana Quiñones acting as a scribe.

## 2024-04-30 NOTE — PROCEDURE
[Large Joint Injection] : Large joint injection [Bilateral] : bilaterally of the [Knee] : knee [Pain] : pain [Inflammation] : inflammation [Alcohol] : alcohol [Betadine] : betadine [Ethyl Chloride sprayed topically] : ethyl chloride sprayed topically [Sterile technique used] : sterile technique used [Orthovisc (30mg)] : 30mg of Orthovisc [#1] : series #1 [] : Patient tolerated procedure well [Call if redness, pain or fever occur] : call if redness, pain or fever occur [Previous OTC use and PT nontherapeutic] : patient has tried OTC's including aspirin, Ibuprofen, Aleve, etc or prescription NSAIDS, and/or exercises at home and/or physical therapy without satisfactory response [Patient had decreased mobility in the joint] : patient had decreased mobility in the joint [Risks, benefits, alternatives discussed / Verbal consent obtained] : the risks benefits, and alternatives have been discussed, and verbal consent was obtained [All ultrasound images have been permanently captured and stored accordingly in our picture archiving and communication system] : All ultrasound images have been permanently captured and stored accordingly in our picture archiving and communication system [Visualization of the needle and placement of injection was performed without complication] : visualization of the needle and placement of injection was performed without complication [Effusion] : effusion [de-identified] : 25cc [de-identified] : clear, straw-colored

## 2024-04-30 NOTE — HISTORY OF PRESENT ILLNESS
[9] : 9 [8] : 8 [Sharp] : sharp [Shooting] : shooting [Tightness] : tightness [Nothing helps with pain getting better] : Nothing helps with pain getting better [de-identified] : 4/30/24: Adv OA b/l knees (L=R). LT knee Zilretta in 11/28/23 and RT knee Zilretta 1/2/24- provided good relief for 2- 3 months- pain has returned. Ambulates with cane.    Previous doc: 9/15/20: Mr. Kilo Aaron, a 70-year-old male, presents today for many years of bilateral knee pain and clicking that has progressively worsened this past year. Right worse then left. Had cortisone and gel injections years ago with significant relief. Pain limits his daily activities. Takes NSAIDs. 9/22/20: Orthovisc #2 b/l knees. 9/28/20: Orthovisc #3 b/l knees, feeling better. 10/6/20: Orthovisc #4 b/l knees, improving. 11/14/22: Continued pain in b/l knees- he has gotten about a year relief from orthovisc injections. 11/29/22: Orthovisc #2 b/l knees. 12/6/22: Orthovisc #3 b/l knees. 12/16/22: Orthovisc #4 b/l knees. 5/23/23: Here for f/up bilateral knee. Visco series finished at last visit had been beneficial for a few months. R knee pain and stiffness worsened last month 6/27/23 inj helped a lot - sig improvement - still has some pain - hesitant to do PT due to pain   9/5/23: Here for f/up bilateral knees. States pain has been gradually returning. Right> left today.  11/28/23: Here to f/up b/l knees (R>L). Zilretta in RT knee at last visit provided good relief for 2 months. Reports his LT ankle is swollen. Feels his gait is altered. Ambulates without assistance. Advil occ with mild relief.  1/2/24: FU B/L Knees- left knee is better since zilretta, right knee is worsening. [] : no [FreeTextEntry5] : Patient is here for FU on DORON knee. Pain increasing. Swelling down the ankle. stiffness , numbness//tingling down both legs  [FreeTextEntry7] : Both legs

## 2024-05-07 ENCOUNTER — APPOINTMENT (OUTPATIENT)
Dept: ORTHOPEDIC SURGERY | Facility: CLINIC | Age: 74
End: 2024-05-07
Payer: MEDICARE

## 2024-05-07 VITALS — HEIGHT: 68 IN | BODY MASS INDEX: 29.86 KG/M2 | WEIGHT: 197 LBS

## 2024-05-07 PROCEDURE — 20610 DRAIN/INJ JOINT/BURSA W/O US: CPT | Mod: 50

## 2024-05-07 NOTE — HISTORY OF PRESENT ILLNESS
[9] : 9 [8] : 8 [Sharp] : sharp [Shooting] : shooting [Tightness] : tightness [] : yes [Nothing helps with pain getting better] : Nothing helps with pain getting better

## 2024-05-07 NOTE — PROCEDURE
[Large Joint Injection] : Large joint injection [Bilateral] : bilaterally of the [Knee] : knee [Pain] : pain [Inflammation] : inflammation [Alcohol] : alcohol [Betadine] : betadine [Ethyl Chloride sprayed topically] : ethyl chloride sprayed topically [Sterile technique used] : sterile technique used [Orthovisc (30mg)] : 30mg of Orthovisc [Effusion] : effusion [] : Patient tolerated procedure well [Call if redness, pain or fever occur] : call if redness, pain or fever occur [Previous OTC use and PT nontherapeutic] : patient has tried OTC's including aspirin, Ibuprofen, Aleve, etc or prescription NSAIDS, and/or exercises at home and/or physical therapy without satisfactory response [Patient had decreased mobility in the joint] : patient had decreased mobility in the joint [Risks, benefits, alternatives discussed / Verbal consent obtained] : the risks benefits, and alternatives have been discussed, and verbal consent was obtained [All ultrasound images have been permanently captured and stored accordingly in our picture archiving and communication system] : All ultrasound images have been permanently captured and stored accordingly in our picture archiving and communication system [Visualization of the needle and placement of injection was performed without complication] : visualization of the needle and placement of injection was performed without complication

## 2024-05-14 ENCOUNTER — APPOINTMENT (OUTPATIENT)
Dept: ORTHOPEDIC SURGERY | Facility: CLINIC | Age: 74
End: 2024-05-14
Payer: MEDICARE

## 2024-05-14 PROCEDURE — 20610 DRAIN/INJ JOINT/BURSA W/O US: CPT | Mod: 50

## 2024-05-14 RX ORDER — MELOXICAM 15 MG/1
15 TABLET ORAL
Qty: 30 | Refills: 1 | Status: ACTIVE | COMMUNITY
Start: 2024-05-14 | End: 1900-01-01

## 2024-05-15 NOTE — PROCEDURE
[FreeTextEntry3] : Large joint injection was performed  of the b/l knees. The indication for this procedure was x-ray evidence of Osteoarthritis on this or prior visit. The site was prepped with alcohol and betadine. An injection of Lidocaine 3cc of 1% , Orthovisc 30mg (15mg/ml), # 3 was used.     Patient was advised to call if redness, pain or fever occur and apply ice for 15 minutes out of every hour for the next 12-24 hours as tolerated.   Patient has tried OTC's including aspirin, Ibuprofen, Aleve, etc or prescription NSAIDS, and/or exercises at home and/or physical therapy without satisfactory response, patient had decreased mobility in the joint and the risks benefits, and alternatives have been discussed, and verbal consent was obtained.   The risks, benefits and contents of the injection have been discussed.  Risks include but are not limited to allergic reaction, flare reaction, permanent white skin discoloration at the injection site and infection.  The patient understands the risks and agrees to having the injection.  All questions have been answered.

## 2024-05-15 NOTE — DISCUSSION/SUMMARY
[de-identified] : 74m with b/l knee djd  Orthovisc #3 Injection tolerated well. Post injection instructions reviewed. 1) wbat, cryotherapy 2) rtc 1 week with Dr. Spencer  Aspiration - 17cc right knee  Entered by Dara Westbrook acting as scribe. Dr. Flores- The documentation recorded by the scribe accurately reflects the service I personally performed and the decisions made by me.

## 2024-05-15 NOTE — HISTORY OF PRESENT ILLNESS
[Stabbing] : stabbing [Constant] : constant [Nothing helps with pain getting better] : Nothing helps with pain getting better [de-identified] : 05/14/2024 Mr. ANA LUISA DURÁN, a 74 year old male, pt of Dr. Spencer's presents today for b/l knees and Orthovisc #3.  [] : no [FreeTextEntry5] : pt is here for orthvisco #3 injection , Johanna PT. states pain continues, discomfort continues , pain still travels up / lower leg. no numbness or tingling

## 2024-05-21 ENCOUNTER — APPOINTMENT (OUTPATIENT)
Dept: ORTHOPEDIC SURGERY | Facility: CLINIC | Age: 74
End: 2024-05-21

## 2024-05-21 ENCOUNTER — APPOINTMENT (OUTPATIENT)
Dept: ORTHOPEDIC SURGERY | Facility: CLINIC | Age: 74
End: 2024-05-21
Payer: MEDICARE

## 2024-05-21 VITALS — HEIGHT: 68 IN | BODY MASS INDEX: 29.86 KG/M2 | WEIGHT: 197 LBS

## 2024-05-21 DIAGNOSIS — M17.12 UNILATERAL PRIMARY OSTEOARTHRITIS, LEFT KNEE: ICD-10-CM

## 2024-05-21 DIAGNOSIS — M17.11 UNILATERAL PRIMARY OSTEOARTHRITIS, RIGHT KNEE: ICD-10-CM

## 2024-05-21 PROCEDURE — 20610 DRAIN/INJ JOINT/BURSA W/O US: CPT | Mod: 50

## 2024-05-21 NOTE — PROCEDURE
[FreeTextEntry3] : Large joint injection was performed  of the b/l knees. The indication for this procedure was x-ray evidence of Osteoarthritis on this or prior visit. The site was prepped with alcohol and betadine. An injection of Lidocaine 3cc of 1% , Orthovisc 30mg (15mg/ml), # 4 was used.     Patient was advised to call if redness, pain or fever occur and apply ice for 15 minutes out of every hour for the next 12-24 hours as tolerated.   Patient has tried OTC's including aspirin, Ibuprofen, Aleve, etc or prescription NSAIDS, and/or exercises at home and/or physical therapy without satisfactory response, patient had decreased mobility in the joint and the risks benefits, and alternatives have been discussed, and verbal consent was obtained.   The risks, benefits and contents of the injection have been discussed.  Risks include but are not limited to allergic reaction, flare reaction, permanent white skin discoloration at the injection site and infection.  The patient understands the risks and agrees to having the injection.  All questions have been answered.

## 2024-05-21 NOTE — DISCUSSION/SUMMARY
[de-identified] : 74m with b/l knee djd  Orthovisc #4 Injection tolerated well. Post injection instructions reviewed. 1) wbat, cryotherapy 2) rtc 6 weeks/prn Dr. Spencer   Entered by Dara Westbrook acting as scribe. Dr. Flores- The documentation recorded by the scribe accurately reflects the service I personally performed and the decisions made by me.

## 2024-05-21 NOTE — HISTORY OF PRESENT ILLNESS
[Stabbing] : stabbing [Constant] : constant [Nothing helps with pain getting better] : Nothing helps with pain getting better [de-identified] : 05/21/2024: here to f/up b/l knees and Orthovisc #4  05/14/2024 Mr. ANA LUISA DURÁN, a 74 year old male, pt of Dr. Spencer's presents today for b/l knees and Orthovisc #3 [] : no [FreeTextEntry5] : pt is here for orthvisco #4 injection.

## 2024-07-08 ENCOUNTER — RX RENEWAL (OUTPATIENT)
Age: 74
End: 2024-07-08

## 2024-08-06 ENCOUNTER — APPOINTMENT (OUTPATIENT)
Dept: ORTHOPEDIC SURGERY | Facility: CLINIC | Age: 74
End: 2024-08-06

## 2024-08-06 PROCEDURE — 20610 DRAIN/INJ JOINT/BURSA W/O US: CPT | Mod: 50

## 2024-08-06 PROCEDURE — 99214 OFFICE O/P EST MOD 30 MIN: CPT | Mod: 25

## 2024-08-06 NOTE — HISTORY OF PRESENT ILLNESS
[Dull/Aching] : dull/aching [Throbbing] : throbbing [de-identified] : 8/6/24: Pt with adv b/l knee OA. Finished Orthovisc series on 5/21/24 with Dr. Flores. Reports no relief at all with HA series despite getting relief in the past with this. RT knee worse than LT today. Difficulty bending the RT knee- feels there may be fluid in the knee. Occ jenae. Meloxicam once a day with moderate relief. Ambulates with cane occ.   Previous doc: 9/15/20: Mr. Kilo Aaron, a 70-year-old male, presents today for many years of bilateral knee pain and clicking that has progressively worsened this past year. Right worse then left. Had cortisone and gel injections years ago with significant relief. Pain limits his daily activities. Takes NSAIDs. 9/22/20: Orthovisc #2 b/l knees. 9/28/20: Orthovisc #3 b/l knees, feeling better. 10/6/20: Orthovisc #4 b/l knees, improving. 11/14/22: Continued pain in b/l knees- he has gotten about a year relief from orthovisc injections. 11/29/22: Orthovisc #2 b/l knees. 12/6/22: Orthovisc #3 b/l knees. 12/16/22: Orthovisc #4 b/l knees. 5/23/23: Here for f/up bilateral knee. Visco series finished at last visit had been beneficial for a few months. R knee pain and stiffness worsened last month 6/27/23 inj helped a lot - sig improvement - still has some pain - hesitant to do PT due to pain   9/5/23: Here for f/up bilateral knees. States pain has been gradually returning. Right> left today.  11/28/23: Here to f/up b/l knees (R>L). Zilretta in RT knee at last visit provided good relief for 2 months. Reports his LT ankle is swollen. Feels his gait is altered. Ambulates without assistance. Advil occ with mild relief.  1/2/24: FU B/L Knees- left knee is better since zilretta, right knee is worsening. 4/30/24: Adv OA b/l knees (L=R). LT knee Zilretta in 11/28/23 and RT knee Zilretta 1/2/24- provided good relief for 2- 3 months- pain has returned. Ambulates with cane.   5/7/24: Orthovisc #2 b/l knees.

## 2024-08-06 NOTE — ASSESSMENT
[FreeTextEntry1] : Previous doc: 9/15/20:Has advanced OA on the left knee and moderate OA on the left. Has had HA injections in the past with significant relief. Very active with home exercises at home- would prefer not to take oral medication. Patient elected to begin bilateral Orthovisc injections today. Tolerated well. 9/22/20: Inj tolerated well. 9/28/20: Inj tolerated well. 10/6/20: Inj tolerated well. 11/14/22: Will restart orthovisc injections as this has given him significant relief in the past. He will continue HEP as this has been beneficial for him. 11/29/22: Inj tolerated well. 12/6/22: Inj tolerated well. 12/16/22: Inj tolerated well. 5/23/23: Zilretta bilateral knees today, tolerated well. 10cc yellow clear fluid aspirated R knee. f/up in 6 weeks, consider repeating visco inj vs. TKA discussion. 6/27 doing well min pain -happy with inj - 9/5/23: Repeat Zilretta R knee today, tolerated well. f/up in 3 months, consider repeat Zilretta vs. repeat Orthovisc series. 11/28/23: Adv OA b/l knees. Had RT knee Zilretta inj on 9/5 which provided good relief- too soon to repeat Zilretta in this knee today. Will do LT knee Zilretta today- follow up 2 weeks to do right knee. Given ankle brace today. Follow up 2 weeks.  1/2/24: Left knee doing well since zilretta, will try this for right knee today. 4/30/24: Adv OA b/l knees (L=R). He has had good relief in the past with HA series- he is not quite ready for TKA and would like to avoid multiple steroid inj. Will begin Orthovisc series for b/l knees- injection #1 done today. F/up 1 week to continue series.  5/7/24: Inj tolerated well.  Right knee asp 25cc.  8/6/24: Adv b/l knee OA (R>L). No relief with HA series back in May. He would like to proceed with R TKA BUSTER sometime next year and proceed conservatively for now as he is going to [place] for the winter. B/l knee Zilretta done today- malik well. F/up 3 months for repeat csi and will book surgery at that time. Briefly discussed surgical details and answered all pt questions.

## 2024-08-06 NOTE — DISCUSSION/SUMMARY
[de-identified] : The natural progression of Osteoarthritis was explained to the patient.  We discussed the possible treatment options from conservative to operative.  These included NSAIDS, Glucosamine and Chondrotin sulfate, and Physical Therapy as well different types of injections.  We also discussed that at some point they may progress to needed a TKA.  Information and pamphlets were given when appropriate.  The risks, benefits, contents and alternatives to injection were explained in full to the patient.  Risks outlined include but are not limited to infection, sepsis, bleeding, scarring, skin discoloration, temporary increase in pain, syncopal episode, failure to resolve symptoms, allergic reaction, flare reaction, permanent white skin discoloration, symptom recurrence, and elevation of blood sugar in diabetics.  Patient understood the risks.  All questions were answered.  After discussion of options, patient requested an injection.  Oral informed consent was obtained and sterile prep was done of the injection site.  Sterile technique was used to introduce the mixture.  Patient tolerated the procedure well.  Patient advised to ice the injection site this evening.  Signs and symptoms of infection reviewed and patient advised to call immediately for redness, fevers, and/or chills.  Entered by Shana Quiñones acting as a scribe.

## 2024-08-06 NOTE — IMAGING
[de-identified] : RIGHT KNEE 0-120 with guarding  varus  non tenderness no crepitus mod effusion  +1 edema 5/5 NVI  LEFT KNEE 0-125  varus  no effusion  mild medial joint line tenderness crepitus NVI +3 edema- sees vascular specialist- states he had a vein taken out 5/5 ambulates with cane  LEFT ANKLE NVI +2 edema tender at ATFL and deltoid ligaments 5/5 strength

## 2024-08-06 NOTE — PROCEDURE

## 2024-12-17 ENCOUNTER — APPOINTMENT (OUTPATIENT)
Dept: ORTHOPEDIC SURGERY | Facility: CLINIC | Age: 74
End: 2024-12-17
Payer: MEDICARE

## 2024-12-17 VITALS — BODY MASS INDEX: 29.86 KG/M2 | HEIGHT: 68 IN | WEIGHT: 197 LBS

## 2024-12-17 DIAGNOSIS — M17.12 UNILATERAL PRIMARY OSTEOARTHRITIS, LEFT KNEE: ICD-10-CM

## 2024-12-17 DIAGNOSIS — M17.11 UNILATERAL PRIMARY OSTEOARTHRITIS, RIGHT KNEE: ICD-10-CM

## 2024-12-17 PROCEDURE — 20611 DRAIN/INJ JOINT/BURSA W/US: CPT | Mod: 50

## 2024-12-17 PROCEDURE — 99214 OFFICE O/P EST MOD 30 MIN: CPT | Mod: 25

## 2025-04-29 ENCOUNTER — APPOINTMENT (OUTPATIENT)
Dept: ORTHOPEDIC SURGERY | Facility: CLINIC | Age: 75
End: 2025-04-29
Payer: MEDICARE

## 2025-04-29 VITALS — BODY MASS INDEX: 29.86 KG/M2 | WEIGHT: 197 LBS | HEIGHT: 68 IN

## 2025-04-29 DIAGNOSIS — M17.12 UNILATERAL PRIMARY OSTEOARTHRITIS, LEFT KNEE: ICD-10-CM

## 2025-04-29 DIAGNOSIS — M17.11 UNILATERAL PRIMARY OSTEOARTHRITIS, RIGHT KNEE: ICD-10-CM

## 2025-04-29 PROCEDURE — 99214 OFFICE O/P EST MOD 30 MIN: CPT | Mod: 25

## 2025-04-29 PROCEDURE — 20611 DRAIN/INJ JOINT/BURSA W/US: CPT | Mod: LT

## 2025-04-29 PROCEDURE — 73562 X-RAY EXAM OF KNEE 3: CPT | Mod: RT

## 2025-04-30 ENCOUNTER — APPOINTMENT (OUTPATIENT)
Dept: CT IMAGING | Facility: CLINIC | Age: 75
End: 2025-04-30
Payer: MEDICARE

## 2025-04-30 PROCEDURE — 73700 CT LOWER EXTREMITY W/O DYE: CPT | Mod: RT

## 2025-05-06 ENCOUNTER — NON-APPOINTMENT (OUTPATIENT)
Age: 75
End: 2025-05-06

## 2025-06-06 ENCOUNTER — OUTPATIENT (OUTPATIENT)
Dept: OUTPATIENT SERVICES | Facility: HOSPITAL | Age: 75
LOS: 1 days | End: 2025-06-06

## 2025-06-06 VITALS
WEIGHT: 203.05 LBS | DIASTOLIC BLOOD PRESSURE: 89 MMHG | HEIGHT: 67 IN | HEART RATE: 69 BPM | OXYGEN SATURATION: 97 % | RESPIRATION RATE: 17 BRPM | SYSTOLIC BLOOD PRESSURE: 124 MMHG | TEMPERATURE: 98 F

## 2025-06-06 DIAGNOSIS — Z01.818 ENCOUNTER FOR OTHER PREPROCEDURAL EXAMINATION: ICD-10-CM

## 2025-06-06 DIAGNOSIS — Z95.0 PRESENCE OF CARDIAC PACEMAKER: ICD-10-CM

## 2025-06-06 DIAGNOSIS — I10 ESSENTIAL (PRIMARY) HYPERTENSION: ICD-10-CM

## 2025-06-06 DIAGNOSIS — Z95.0 PRESENCE OF CARDIAC PACEMAKER: Chronic | ICD-10-CM

## 2025-06-06 DIAGNOSIS — M17.11 UNILATERAL PRIMARY OSTEOARTHRITIS, RIGHT KNEE: ICD-10-CM

## 2025-06-06 DIAGNOSIS — E03.9 HYPOTHYROIDISM, UNSPECIFIED: ICD-10-CM

## 2025-06-06 DIAGNOSIS — I25.10 ATHEROSCLEROTIC HEART DISEASE OF NATIVE CORONARY ARTERY WITHOUT ANGINA PECTORIS: ICD-10-CM

## 2025-06-06 DIAGNOSIS — Z98.890 OTHER SPECIFIED POSTPROCEDURAL STATES: Chronic | ICD-10-CM

## 2025-06-06 LAB
A1C WITH ESTIMATED AVERAGE GLUCOSE RESULT: 5.9 % — HIGH (ref 4–5.6)
ALBUMIN SERPL ELPH-MCNC: 3.6 G/DL — SIGNIFICANT CHANGE UP (ref 3.3–5)
ALP SERPL-CCNC: 78 U/L — SIGNIFICANT CHANGE UP (ref 40–120)
ALT FLD-CCNC: 28 U/L — SIGNIFICANT CHANGE UP (ref 12–78)
ANION GAP SERPL CALC-SCNC: 2 MMOL/L — LOW (ref 5–17)
APTT BLD: 34.6 SEC — SIGNIFICANT CHANGE UP (ref 26.1–36.8)
AST SERPL-CCNC: 19 U/L — SIGNIFICANT CHANGE UP (ref 15–37)
BASOPHILS # BLD AUTO: 0.05 K/UL — SIGNIFICANT CHANGE UP (ref 0–0.2)
BASOPHILS NFR BLD AUTO: 0.9 % — SIGNIFICANT CHANGE UP (ref 0–2)
BILIRUB SERPL-MCNC: 0.7 MG/DL — SIGNIFICANT CHANGE UP (ref 0.2–1.2)
BUN SERPL-MCNC: 20 MG/DL — SIGNIFICANT CHANGE UP (ref 7–23)
CALCIUM SERPL-MCNC: 9.2 MG/DL — SIGNIFICANT CHANGE UP (ref 8.5–10.1)
CHLORIDE SERPL-SCNC: 108 MMOL/L — SIGNIFICANT CHANGE UP (ref 96–108)
CO2 SERPL-SCNC: 28 MMOL/L — SIGNIFICANT CHANGE UP (ref 22–31)
CREAT SERPL-MCNC: 1.07 MG/DL — SIGNIFICANT CHANGE UP (ref 0.5–1.3)
EGFR: 71 ML/MIN/1.73M2 — SIGNIFICANT CHANGE UP
EGFR: 71 ML/MIN/1.73M2 — SIGNIFICANT CHANGE UP
EOSINOPHIL # BLD AUTO: 0.1 K/UL — SIGNIFICANT CHANGE UP (ref 0–0.5)
EOSINOPHIL NFR BLD AUTO: 1.7 % — SIGNIFICANT CHANGE UP (ref 0–6)
ESTIMATED AVERAGE GLUCOSE: 123 MG/DL — HIGH (ref 68–114)
GLUCOSE SERPL-MCNC: 101 MG/DL — HIGH (ref 70–99)
HCT VFR BLD CALC: 41.9 % — SIGNIFICANT CHANGE UP (ref 39–50)
HGB BLD-MCNC: 14.3 G/DL — SIGNIFICANT CHANGE UP (ref 13–17)
IMM GRANULOCYTES NFR BLD AUTO: 0.7 % — SIGNIFICANT CHANGE UP (ref 0–0.9)
INR BLD: 1.12 RATIO — SIGNIFICANT CHANGE UP (ref 0.85–1.16)
LYMPHOCYTES # BLD AUTO: 1.45 K/UL — SIGNIFICANT CHANGE UP (ref 1–3.3)
LYMPHOCYTES # BLD AUTO: 25 % — SIGNIFICANT CHANGE UP (ref 13–44)
MCHC RBC-ENTMCNC: 31.8 PG — SIGNIFICANT CHANGE UP (ref 27–34)
MCHC RBC-ENTMCNC: 34.1 G/DL — SIGNIFICANT CHANGE UP (ref 32–36)
MCV RBC AUTO: 93.1 FL — SIGNIFICANT CHANGE UP (ref 80–100)
MONOCYTES # BLD AUTO: 0.71 K/UL — SIGNIFICANT CHANGE UP (ref 0–0.9)
MONOCYTES NFR BLD AUTO: 12.3 % — SIGNIFICANT CHANGE UP (ref 2–14)
MRSA PCR RESULT.: SIGNIFICANT CHANGE UP
NEUTROPHILS # BLD AUTO: 3.44 K/UL — SIGNIFICANT CHANGE UP (ref 1.8–7.4)
NEUTROPHILS NFR BLD AUTO: 59.4 % — SIGNIFICANT CHANGE UP (ref 43–77)
NRBC BLD AUTO-RTO: 0 /100 WBCS — SIGNIFICANT CHANGE UP (ref 0–0)
PLATELET # BLD AUTO: 171 K/UL — SIGNIFICANT CHANGE UP (ref 150–400)
POTASSIUM SERPL-MCNC: 4.4 MMOL/L — SIGNIFICANT CHANGE UP (ref 3.5–5.3)
POTASSIUM SERPL-SCNC: 4.4 MMOL/L — SIGNIFICANT CHANGE UP (ref 3.5–5.3)
PROT SERPL-MCNC: 7.2 GM/DL — SIGNIFICANT CHANGE UP (ref 6–8.3)
PROTHROM AB SERPL-ACNC: 12.6 SEC — SIGNIFICANT CHANGE UP (ref 9.9–13.4)
RBC # BLD: 4.5 M/UL — SIGNIFICANT CHANGE UP (ref 4.2–5.8)
RBC # FLD: 13.1 % — SIGNIFICANT CHANGE UP (ref 10.3–14.5)
S AUREUS DNA NOSE QL NAA+PROBE: SIGNIFICANT CHANGE UP
SODIUM SERPL-SCNC: 138 MMOL/L — SIGNIFICANT CHANGE UP (ref 135–145)
VIT D25+D1,25 OH+D1,25 PNL SERPL-MCNC: 39.8 PG/ML — SIGNIFICANT CHANGE UP (ref 19.9–79.3)
WBC # BLD: 5.79 K/UL — SIGNIFICANT CHANGE UP (ref 3.8–10.5)
WBC # FLD AUTO: 5.79 K/UL — SIGNIFICANT CHANGE UP (ref 3.8–10.5)

## 2025-06-06 NOTE — H&P PST ADULT - ASSESSMENT
right knee osteoarthritis  CAPRINI SCORE    AGE RELATED RISK FACTORS                                                             [ ] Age 41-60 years                                            (1 Point)  [ ] Age: 61-74 years                                           (2 Points)                 [x ] Age= 75 years                                                (3 Points)             DISEASE RELATED RISK FACTORS                                                       [ ] Edema in the lower extremities                 (1 Point)                     [ ] Varicose veins                                               (1 Point)                                 [x ] BMI > 25 Kg/m2                                            (1 Point)                                  [ ] Serious infection (ie PNA)                            (1 Point)                     [ ] Lung disease ( COPD, Emphysema)            (1 Point)                                                                          [ ] Acute myocardial infarction                         (1 Point)                  [ ] Congestive heart failure (in the previous month)  (1 Point)         [ ] Inflammatory bowel disease                            (1 Point)                  [ ] Central venous access, PICC or Port               (2 points)       (within the last month)                                                                [ ] Stroke (in the previous month)                        (5 Points)    [ ] Previous or present malignancy                       (2 points)                                                                                                                                                         HEMATOLOGY RELATED FACTORS                                                         [ ] Prior episodes of VTE                                     (3 Points)                     [ ] Positive family history for VTE                      (3 Points)                  [ ] Prothrombin 87746 A                                     (3 Points)                     [ ] Factor V Leiden                                                (3 Points)                        [ ] Lupus anticoagulants                                      (3 Points)                                                           [ ] Anticardiolipin antibodies                              (3 Points)                                                       [ ] High homocysteine in the blood                   (3 Points)                                             [ ] Other congenital or acquired thrombophilia      (3 Points)                                                [ ] Heparin induced thrombocytopenia                  (3 Points)                                        MOBILITY RELATED FACTORS  [ ] Bed rest                                                         (1 Point)  [ ] Plaster cast                                                    (2 points)  [ ] Bed bound for more than 72 hours           (2 Points)    GENDER SPECIFIC FACTORS  [ ] Pregnancy or had a baby within the last month   (1 Point)  [ ] Post-partum < 6 weeks                                   (1 Point)  [ ] Hormonal therapy  or oral contraception   (1 Point)  [ ] History of pregnancy complications              (1 point)  [ ] Unexplained or recurrent              (1 Point)    OTHER RISK FACTORS                                           (1 Point)  [ ] BMI >40, smoking, diabetes requiring insulin, chemotherapy  blood transfusions and length of surgery over 2 hours    SURGERY RELATED RISK FACTORS  [ ]  Section within the last month     (1 Point)  [ ] Minor surgery                                                  (1 Point)  [ ] Arthroscopic surgery                                       (2 Points)  [ ] Planned major surgery lasting more            (2 Points)      than 45 minutes     x[ ] Elective hip or knee joint replacement       (5 points)       surgery                                                TRAUMA RELATED RISK FACTORS  [ ] Fracture of the hip, pelvis, or leg                       (5 Points)  [ ] Spinal cord injury resulting in paralysis             (5 points)       (in the previous month)    [ ] Paralysis  (less than 1 month)                             (5 Points)  [ ] Multiple Trauma within 1 month                        (5 Points)    Total Score [      9  ]    Caprini Score 0-2: Low Risk, NO VTE prophylaxis required for most patients, encourage ambulation  Caprini Score 3-6: Moderate Risk , pharmacologic VTE prophylaxis is indicated for most patients (in the absence of contraindications)  Caprini Score Greater than or =7: High risk, pharmocologic VTE prophylaxis indicated for most patients (in the absence of contraindications)

## 2025-06-06 NOTE — H&P PST ADULT - NSICDXPASTMEDICALHX_GEN_ALL_CORE_FT
PAST MEDICAL HISTORY:  CAD (coronary artery disease)     HTN (hypertension)     Hypothyroid     Pacemaker

## 2025-06-06 NOTE — H&P PST ADULT - NEUROLOGICAL
Bipap overnight  NTG drip, restart home BP meds  Diurese  2D echo and serial troponins normal/cranial nerves II-XII intact/sensation intact negative

## 2025-06-06 NOTE — OCCUPATIONAL THERAPY INITIAL EVALUATION ADULT - PERTINENT HX OF CURRENT PROBLEM, REHAB EVAL
R knee OA which impacts pts ability to perform functional tasks/transfers and mobility. Pt is scheduled for R TKR on 6/25/25.

## 2025-06-06 NOTE — H&P PST ADULT - HISTORY OF PRESENT ILLNESS
74 yo male , pmh- htn, hypothyroid , CAD cardiac stent in 2021 on aspirin , pacemaker inserted in 2013 and changed in Jan. 2025  c/o right knee pain 2/2 osteoarthritis - scheduled for  right knee arthroplasty  goal: to walk without pain    recent travels in the past 30 days., [place] - No fever, SOB, cough, flu like symptoms or body rash- covid screen

## 2025-06-06 NOTE — OCCUPATIONAL THERAPY INITIAL EVALUATION ADULT - NSOTDMEREC_GEN_A_CORE
The patient has a mobility limitation that significantly impairs their ability to participate independently in mobility-related activities of daily living (MRADLs) in the home. This functional deficit can be sufficiently resolved with the use of 2-wheeled rolling walker. The patient has mobility limitations that increase their falls risk and impair their endurance. At times, they are limited to staying in one room due to fluctuating pain levels & balance deficits related to post operative weakness. The patient will require a commode to attend safely to their toileting needs. Pt deferred 3/1 commode and will be ordering a raised toilet seat with arms.

## 2025-06-06 NOTE — H&P PST ADULT - NSANTHNECKRD_ENT_A_CORE
Reports somewhat decreased FM.  US reviewed, EFW 56%, VTX, MVP 6.0, BPP 8 of 8.  FKC's, S/S labor and when to go to hospital reviewed.  Advised GBS negative.  RTC 1 week  
No

## 2025-06-06 NOTE — OCCUPATIONAL THERAPY INITIAL EVALUATION ADULT - ADDITIONAL COMMENTS
Pt lives with spouse (Who can assist post op) in a private house with 3 steps to enter with bilateral handrails (close to reach). Once inside, the pt main bedroom and bathroom is on that floor when entering. The pts bathroom has a walk in shower stall, fixed/retractable shower head, standard toilet seat and no grab bars. The pt reports that a 3/1 commode does not fit over the toilet at home. The pt ambulates with no device and does not own any device for ambulation. The pt daily pain is a 0/10 at rest and a 3/10 with movement. The pt manages the pain with rest and Advil prn. The pt recently had outpatient PT, no recent falls and has no buckling of the knees. The pt wears glasses for reading, R handed, drives and has no hearing impairments.

## 2025-06-11 RX ORDER — LISINOPRIL 5 MG/1
1 TABLET ORAL
Refills: 0 | DISCHARGE

## 2025-06-11 RX ORDER — ROSUVASTATIN CALCIUM 20 MG/1
1 TABLET, FILM COATED ORAL
Refills: 0 | DISCHARGE

## 2025-06-11 RX ORDER — ESOMEPRAZOLE MAGNESIUM 40 MG/1
1 CAPSULE, DELAYED RELEASE ORAL
Refills: 0 | DISCHARGE

## 2025-06-11 RX ORDER — ASPIRIN 325 MG
1 TABLET ORAL
Refills: 0 | DISCHARGE

## 2025-06-11 RX ORDER — METOPROLOL SUCCINATE 50 MG/1
1 TABLET, EXTENDED RELEASE ORAL
Refills: 0 | DISCHARGE

## 2025-06-11 RX ORDER — LEVOTHYROXINE SODIUM 300 MCG
1 TABLET ORAL
Refills: 0 | DISCHARGE

## 2025-06-25 PROBLEM — E03.9 HYPOTHYROIDISM, UNSPECIFIED: Chronic | Status: ACTIVE | Noted: 2025-06-06

## 2025-07-16 NOTE — H&P ADULT - NSHPPHYSICALEXAM_GEN_ALL_CORE
WD WN male  Right knee: skin intact, pain with ROM of knee  NVI, motor/sensory intact throughout  BCR with warm toes

## 2025-07-17 ENCOUNTER — INPATIENT (INPATIENT)
Facility: HOSPITAL | Age: 75
LOS: 0 days | Discharge: HOME HEALTH | End: 2025-07-18
Attending: ORTHOPAEDIC SURGERY | Admitting: ORTHOPAEDIC SURGERY
Payer: MEDICARE

## 2025-07-17 ENCOUNTER — APPOINTMENT (OUTPATIENT)
Dept: ORTHOPEDIC SURGERY | Facility: HOSPITAL | Age: 75
End: 2025-07-17

## 2025-07-17 ENCOUNTER — TRANSCRIPTION ENCOUNTER (OUTPATIENT)
Age: 75
End: 2025-07-17

## 2025-07-17 ENCOUNTER — RESULT REVIEW (OUTPATIENT)
Age: 75
End: 2025-07-17

## 2025-07-17 VITALS
SYSTOLIC BLOOD PRESSURE: 124 MMHG | RESPIRATION RATE: 18 BRPM | HEART RATE: 83 BPM | DIASTOLIC BLOOD PRESSURE: 78 MMHG | TEMPERATURE: 98 F | OXYGEN SATURATION: 98 % | HEIGHT: 67 IN | WEIGHT: 199.96 LBS

## 2025-07-17 DIAGNOSIS — Z95.0 PRESENCE OF CARDIAC PACEMAKER: Chronic | ICD-10-CM

## 2025-07-17 DIAGNOSIS — Z98.890 OTHER SPECIFIED POSTPROCEDURAL STATES: Chronic | ICD-10-CM

## 2025-07-17 LAB
ANION GAP SERPL CALC-SCNC: 6 MMOL/L — SIGNIFICANT CHANGE UP (ref 5–17)
BUN SERPL-MCNC: 21 MG/DL — SIGNIFICANT CHANGE UP (ref 7–23)
CALCIUM SERPL-MCNC: 8.4 MG/DL — LOW (ref 8.5–10.1)
CHLORIDE SERPL-SCNC: 111 MMOL/L — HIGH (ref 96–108)
CO2 SERPL-SCNC: 21 MMOL/L — LOW (ref 22–31)
CREAT SERPL-MCNC: 1.08 MG/DL — SIGNIFICANT CHANGE UP (ref 0.5–1.3)
EGFR: 71 ML/MIN/1.73M2 — SIGNIFICANT CHANGE UP
EGFR: 71 ML/MIN/1.73M2 — SIGNIFICANT CHANGE UP
GLUCOSE BLDC GLUCOMTR-MCNC: 115 MG/DL — HIGH (ref 70–99)
GLUCOSE SERPL-MCNC: 119 MG/DL — HIGH (ref 70–99)
HCT VFR BLD CALC: 35.7 % — LOW (ref 39–50)
HGB BLD-MCNC: 12.1 G/DL — LOW (ref 13–17)
MCHC RBC-ENTMCNC: 31.6 PG — SIGNIFICANT CHANGE UP (ref 27–34)
MCHC RBC-ENTMCNC: 33.9 G/DL — SIGNIFICANT CHANGE UP (ref 32–36)
MCV RBC AUTO: 93.2 FL — SIGNIFICANT CHANGE UP (ref 80–100)
NRBC BLD AUTO-RTO: 0 /100 WBCS — SIGNIFICANT CHANGE UP (ref 0–0)
PLATELET # BLD AUTO: 118 K/UL — LOW (ref 150–400)
POTASSIUM SERPL-MCNC: 3.6 MMOL/L — SIGNIFICANT CHANGE UP (ref 3.5–5.3)
POTASSIUM SERPL-SCNC: 3.6 MMOL/L — SIGNIFICANT CHANGE UP (ref 3.5–5.3)
RBC # BLD: 3.83 M/UL — LOW (ref 4.2–5.8)
RBC # FLD: 13.2 % — SIGNIFICANT CHANGE UP (ref 10.3–14.5)
SODIUM SERPL-SCNC: 138 MMOL/L — SIGNIFICANT CHANGE UP (ref 135–145)
WBC # BLD: 6.38 K/UL — SIGNIFICANT CHANGE UP (ref 3.8–10.5)
WBC # FLD AUTO: 6.38 K/UL — SIGNIFICANT CHANGE UP (ref 3.8–10.5)

## 2025-07-17 PROCEDURE — 27447 TOTAL KNEE ARTHROPLASTY: CPT | Mod: RT

## 2025-07-17 PROCEDURE — 88311 DECALCIFY TISSUE: CPT | Mod: 26

## 2025-07-17 PROCEDURE — 73560 X-RAY EXAM OF KNEE 1 OR 2: CPT | Mod: 26,RT

## 2025-07-17 PROCEDURE — 88305 TISSUE EXAM BY PATHOLOGIST: CPT | Mod: 26

## 2025-07-17 PROCEDURE — 20985 CPTR-ASST DIR MS PX: CPT

## 2025-07-17 DEVICE — MAKO BONE PIN 3.2MM X 140MM: Type: IMPLANTABLE DEVICE | Site: RIGHT | Status: FUNCTIONAL

## 2025-07-17 DEVICE — BASEPLATE TIB TRIATHLON TRITAN SZ 4: Type: IMPLANTABLE DEVICE | Site: RIGHT | Status: FUNCTIONAL

## 2025-07-17 DEVICE — INSERT TIB BEARING CS X3 SZ 4 10MM: Type: IMPLANTABLE DEVICE | Site: RIGHT | Status: FUNCTIONAL

## 2025-07-17 DEVICE — MAKO BONE PIN 3.2MM X 110MM: Type: IMPLANTABLE DEVICE | Site: RIGHT | Status: FUNCTIONAL

## 2025-07-17 DEVICE — PATELLA ASYMM TRIATHLON SZ A 32X10MM: Type: IMPLANTABLE DEVICE | Site: RIGHT | Status: FUNCTIONAL

## 2025-07-17 DEVICE — COMP FEM TRIATHLON CR SZ3 RT: Type: IMPLANTABLE DEVICE | Site: RIGHT | Status: FUNCTIONAL

## 2025-07-17 RX ORDER — METOPROLOL SUCCINATE 50 MG/1
100 TABLET, EXTENDED RELEASE ORAL DAILY
Refills: 0 | Status: DISCONTINUED | OUTPATIENT
Start: 2025-07-17 | End: 2025-07-18

## 2025-07-17 RX ORDER — HYDROMORPHONE/SOD CHLOR,ISO/PF 2 MG/10 ML
0.2 SYRINGE (ML) INJECTION
Refills: 0 | Status: DISCONTINUED | OUTPATIENT
Start: 2025-07-17 | End: 2025-07-17

## 2025-07-17 RX ORDER — ASPIRIN 325 MG
81 TABLET ORAL
Refills: 0 | Status: DISCONTINUED | OUTPATIENT
Start: 2025-07-18 | End: 2025-07-18

## 2025-07-17 RX ORDER — SENNA 187 MG
2 TABLET ORAL AT BEDTIME
Refills: 0 | Status: DISCONTINUED | OUTPATIENT
Start: 2025-07-17 | End: 2025-07-18

## 2025-07-17 RX ORDER — MAGNESIUM HYDROXIDE 400 MG/5ML
30 SUSPENSION ORAL DAILY
Refills: 0 | Status: DISCONTINUED | OUTPATIENT
Start: 2025-07-17 | End: 2025-07-18

## 2025-07-17 RX ORDER — CEFAZOLIN SODIUM IN 0.9 % NACL 3 G/100 ML
2000 INTRAVENOUS SOLUTION, PIGGYBACK (ML) INTRAVENOUS EVERY 8 HOURS
Refills: 0 | Status: COMPLETED | OUTPATIENT
Start: 2025-07-17 | End: 2025-07-18

## 2025-07-17 RX ORDER — ONDANSETRON HCL/PF 4 MG/2 ML
4 VIAL (ML) INJECTION ONCE
Refills: 0 | Status: DISCONTINUED | OUTPATIENT
Start: 2025-07-17 | End: 2025-07-17

## 2025-07-17 RX ORDER — ACETAMINOPHEN 500 MG/5ML
1000 LIQUID (ML) ORAL EVERY 8 HOURS
Refills: 0 | Status: DISCONTINUED | OUTPATIENT
Start: 2025-07-17 | End: 2025-07-18

## 2025-07-17 RX ORDER — LEVOTHYROXINE SODIUM 300 MCG
50 TABLET ORAL DAILY
Refills: 0 | Status: DISCONTINUED | OUTPATIENT
Start: 2025-07-17 | End: 2025-07-18

## 2025-07-17 RX ORDER — B1/B2/B3/B5/B6/B12/VIT C/FOLIC 500-0.5 MG
1 TABLET ORAL DAILY
Refills: 0 | Status: DISCONTINUED | OUTPATIENT
Start: 2025-07-17 | End: 2025-07-18

## 2025-07-17 RX ORDER — ONDANSETRON HCL/PF 4 MG/2 ML
4 VIAL (ML) INJECTION EVERY 6 HOURS
Refills: 0 | Status: DISCONTINUED | OUTPATIENT
Start: 2025-07-17 | End: 2025-07-18

## 2025-07-17 RX ORDER — OXYCODONE HYDROCHLORIDE 30 MG/1
5 TABLET ORAL
Refills: 0 | Status: DISCONTINUED | OUTPATIENT
Start: 2025-07-17 | End: 2025-07-18

## 2025-07-17 RX ORDER — SODIUM CHLORIDE 9 G/1000ML
1000 INJECTION, SOLUTION INTRAVENOUS
Refills: 0 | Status: DISCONTINUED | OUTPATIENT
Start: 2025-07-17 | End: 2025-07-17

## 2025-07-17 RX ORDER — ACETAMINOPHEN 500 MG/5ML
1000 LIQUID (ML) ORAL ONCE
Refills: 0 | Status: DISCONTINUED | OUTPATIENT
Start: 2025-07-17 | End: 2025-07-18

## 2025-07-17 RX ORDER — POLYETHYLENE GLYCOL 3350 17 G/17G
17 POWDER, FOR SOLUTION ORAL AT BEDTIME
Refills: 0 | Status: DISCONTINUED | OUTPATIENT
Start: 2025-07-17 | End: 2025-07-18

## 2025-07-17 RX ORDER — LISINOPRIL 5 MG/1
10 TABLET ORAL DAILY
Refills: 0 | Status: DISCONTINUED | OUTPATIENT
Start: 2025-07-17 | End: 2025-07-18

## 2025-07-17 RX ORDER — KETOROLAC TROMETHAMINE 30 MG/ML
30 INJECTION, SOLUTION INTRAMUSCULAR; INTRAVENOUS EVERY 8 HOURS
Refills: 0 | Status: COMPLETED | OUTPATIENT
Start: 2025-07-17 | End: 2025-07-18

## 2025-07-17 RX ORDER — OXYCODONE HYDROCHLORIDE 30 MG/1
10 TABLET ORAL
Refills: 0 | Status: DISCONTINUED | OUTPATIENT
Start: 2025-07-17 | End: 2025-07-18

## 2025-07-17 RX ORDER — ACETAMINOPHEN 500 MG/5ML
650 LIQUID (ML) ORAL ONCE
Refills: 0 | Status: COMPLETED | OUTPATIENT
Start: 2025-07-17 | End: 2025-07-17

## 2025-07-17 RX ORDER — ROSUVASTATIN CALCIUM 20 MG/1
20 TABLET, FILM COATED ORAL AT BEDTIME
Refills: 0 | Status: DISCONTINUED | OUTPATIENT
Start: 2025-07-17 | End: 2025-07-18

## 2025-07-17 RX ORDER — CELECOXIB 50 MG/1
200 CAPSULE ORAL ONCE
Refills: 0 | Status: COMPLETED | OUTPATIENT
Start: 2025-07-17 | End: 2025-07-17

## 2025-07-17 RX ORDER — CELECOXIB 50 MG/1
200 CAPSULE ORAL EVERY 12 HOURS
Refills: 0 | Status: DISCONTINUED | OUTPATIENT
Start: 2025-07-18 | End: 2025-07-18

## 2025-07-17 RX ORDER — HYDROMORPHONE/SOD CHLOR,ISO/PF 2 MG/10 ML
0.5 SYRINGE (ML) INJECTION
Refills: 0 | Status: DISCONTINUED | OUTPATIENT
Start: 2025-07-17 | End: 2025-07-18

## 2025-07-17 RX ORDER — OXYCODONE HYDROCHLORIDE 30 MG/1
15 TABLET ORAL EVERY 4 HOURS
Refills: 0 | Status: DISCONTINUED | OUTPATIENT
Start: 2025-07-17 | End: 2025-07-18

## 2025-07-17 RX ORDER — DEXAMETHASONE 0.5 MG/1
10 TABLET ORAL ONCE
Refills: 0 | Status: COMPLETED | OUTPATIENT
Start: 2025-07-18 | End: 2025-07-18

## 2025-07-17 RX ADMIN — SODIUM CHLORIDE 75 MILLILITER(S): 9 INJECTION, SOLUTION INTRAVENOUS at 11:06

## 2025-07-17 RX ADMIN — Medication 150 MILLILITER(S): at 20:01

## 2025-07-17 RX ADMIN — Medication 1 TABLET(S): at 12:38

## 2025-07-17 RX ADMIN — Medication 650 MILLIGRAM(S): at 07:20

## 2025-07-17 RX ADMIN — Medication 100 MILLIGRAM(S): at 17:02

## 2025-07-17 RX ADMIN — Medication 1000 MILLIGRAM(S): at 22:28

## 2025-07-17 RX ADMIN — LISINOPRIL 10 MILLIGRAM(S): 5 TABLET ORAL at 21:28

## 2025-07-17 RX ADMIN — Medication 1000 MILLIGRAM(S): at 21:28

## 2025-07-17 RX ADMIN — Medication 150 MILLILITER(S): at 12:37

## 2025-07-17 RX ADMIN — Medication 40 MILLIGRAM(S): at 12:38

## 2025-07-17 RX ADMIN — Medication 2 TABLET(S): at 21:29

## 2025-07-17 RX ADMIN — KETOROLAC TROMETHAMINE 30 MILLIGRAM(S): 30 INJECTION, SOLUTION INTRAMUSCULAR; INTRAVENOUS at 21:00

## 2025-07-17 RX ADMIN — ROSUVASTATIN CALCIUM 20 MILLIGRAM(S): 20 TABLET, FILM COATED ORAL at 21:28

## 2025-07-17 RX ADMIN — CELECOXIB 200 MILLIGRAM(S): 50 CAPSULE ORAL at 07:20

## 2025-07-17 RX ADMIN — KETOROLAC TROMETHAMINE 30 MILLIGRAM(S): 30 INJECTION, SOLUTION INTRAMUSCULAR; INTRAVENOUS at 20:00

## 2025-07-17 RX ADMIN — Medication 500 MILLIGRAM(S): at 17:02

## 2025-07-17 RX ADMIN — METOPROLOL SUCCINATE 100 MILLIGRAM(S): 50 TABLET, EXTENDED RELEASE ORAL at 21:28

## 2025-07-17 NOTE — DISCHARGE NOTE PROVIDER - NSDCFUADDINST_GEN_ALL_CORE_FT
Keep knee straight while at rest. Elevate the leg as much as possible ("toes above the nose") to help control swelling. Make sure you get up and take a brief walk every two hours to help with circulation and prevent stiffness. Incentive spirometer 10X/hour. Cryocuff to help with pain/inflammation.   Per Dr. Spencer: may advance from walker as tolerated per discretion of physical therapist.   Keep Prineo Dressing Clean, Dry and Intact. May shower with Prineo Dressing. Please do not scrub, soak, peel or pick at the prineo dressing. No creams, lotions, or oils over dressing. May shower and let water run over incision, no baths. Pat dry once out of shower. Dressing to be removed in office at follow up visit in 2 weeks. There are no staples or stitches that need to be removed.

## 2025-07-17 NOTE — OCCUPATIONAL THERAPY INITIAL EVALUATION ADULT - ADDITIONAL COMMENTS
Pt lives with spouse (Who can assist post op) in a private house with 3 steps to enter with bilateral handrails (close to reach). Once inside, the pt main bedroom and bathroom is on that floor when entering. The pts bathroom has a walk in shower stall, fixed/retractable shower head, standard toilet seat and no grab bars. The pt reports that a 3/1 commode does not fit over the toilet at home. The pt ambulates with no device and does not own any device for ambulation. The pt wears glasses for reading, R handed, drives and has no hearing impairments.

## 2025-07-17 NOTE — PHYSICAL THERAPY INITIAL EVALUATION ADULT - GENERAL OBSERVATIONS, REHAB EVAL
Pt encountered semi-fowlers in bed w/ NAD, AxOx4, +surgical dressing C/D/I, +SCD, +IV, +bedalarm; family present, and reports 2/10 pain at rest and 3/10 w/ activity.

## 2025-07-17 NOTE — DISCHARGE NOTE PROVIDER - CARE PROVIDER_API CALL
Germain Spencer)  Orthopaedic Surgery  [address], [address]  [place] [address]  Phone: [phone]  Fax: [phone]  Follow Up Time:

## 2025-07-17 NOTE — PHYSICAL THERAPY INITIAL EVALUATION ADULT - STRENGTHENING, PT EVAL
Pt will improve R Knee strength enough to 4-/5 grossly to improve transfer, stair, and gait efficiency within x8 weeks.

## 2025-07-17 NOTE — DISCHARGE NOTE PROVIDER - NSDCMRMEDTOKEN_GEN_ALL_CORE_FT
Aspir 81 oral delayed release tablet: 1 tab(s) orally once a day  esomeprazole 40 mg oral delayed release capsule: 1 cap(s) orally once a day  levothyroxine 50 mcg (0.05 mg) oral tablet: 1 tab(s) orally once a day  lisinopril 10 mg oral tablet: 1 tab(s) orally once a day  metoprolol succinate 100 mg oral tablet, extended release: 1 tab(s) orally once a day  rosuvastatin 20 mg oral tablet: 1 tab(s) orally once a day   acetaminophen 500 mg oral tablet: 2 tab(s) orally every 8 hours  aspirin 81 mg oral delayed release tablet: 1 tab(s) orally 2 times a day  celecoxib 200 mg oral capsule: 1 cap(s) orally every 12 hours  esomeprazole 40 mg oral delayed release capsule: 1 cap(s) orally once a day  levothyroxine 50 mcg (0.05 mg) oral tablet: 1 tab(s) orally once a day  lisinopril 10 mg oral tablet: 1 tab(s) orally once a day  metoprolol succinate 100 mg oral tablet, extended release: 1 tab(s) orally once a day  Multiple Vitamins oral tablet: 1 tab(s) orally once a day  Narcan 4 mg/0.1 mL nasal spray: 4 milligram(s) intranasally once , repeat as necessary.   As needed. For suspected opiate overdose   Follow instructions on packet MDD: 0.2 ml  oxyCODONE 5 mg oral tablet: 1 tab(s) orally every 4 hours as needed for  pain 1 tab for mild/moderate pain, 2 tabs for severe pain MDD: 6  polyethylene glycol 3350 oral powder for reconstitution: 17 gram(s) orally once a day (at bedtime)  rosuvastatin 20 mg oral tablet: 1 tab(s) orally once a day  senna leaf extract oral tablet: 2 tab(s) orally once a day (at bedtime)

## 2025-07-17 NOTE — PATIENT PROFILE ADULT - FALL HARM RISK - HARM RISK INTERVENTIONS
Communicate Risk of Fall with Harm to all staff/Reinforce activity limits and safety measures with patient and family/Tailored Fall Risk Interventions/Visual Cue: Yellow wristband and red socks/Bed in lowest position, wheels locked, appropriate side rails in place/Call bell, personal items and telephone in reach/Instruct patient to call for assistance before getting out of bed or chair/Non-slip footwear when patient is out of bed/[place] to call system/Physically safe environment - no spills, clutter or unnecessary equipment/Purposeful Proactive Rounding/Room/bathroom lighting operational, light cord in reach Assistance with ambulation/Assistance OOB with selected safe patient handling equipment/Communicate Risk of Fall with Harm to all staff/Discuss with provider need for PT consult/Monitor gait and stability/Provide patient with walking aids - walker, cane, crutches/Reinforce activity limits and safety measures with patient and family/Review medications for side effects contributing to fall risk/Sit up slowly, dangle for a short time, stand at bedside before walking/Tailored Fall Risk Interventions/Toileting schedule using arm’s reach rule for commode and bathroom/Use of alarms - bed, chair and/or voice tab/Visual Cue: Yellow wristband and red socks/Bed in lowest position, wheels locked, appropriate side rails in place/Call bell, personal items and telephone in reach/Instruct patient to call for assistance before getting out of bed or chair/Non-slip footwear when patient is out of bed/[place] to call system/Physically safe environment - no spills, clutter or unnecessary equipment/Purposeful Proactive Rounding/Room/bathroom lighting operational, light cord in reach

## 2025-07-17 NOTE — DISCHARGE NOTE PROVIDER - HOSPITAL COURSE
75yMale with history of osteoarthritis of right knee presenting for right TKA by Dr. Germain Spencer on 7/17/25. Risk and benefits of surgery were explained to the patient. The patient understood and agreed to proceed with surgery. Patient underwent the procedure with no intraoperative complications. Pt was brought in stable condition to the PACU. Once stable in PACU, pt was brought to the floor. During hospital stay pt was followed by Medicine,  during this admission. Pt hospital course was XX. Pt is stable for discharge to XX on POD# 75yMale with history of osteoarthritis of right knee presenting for right TKA by Dr. Germain Spencer on 7/17/25. Risk and benefits of surgery were explained to the patient. The patient understood and agreed to proceed with surgery. Patient underwent the procedure with no intraoperative complications. Pt was brought in stable condition to the PACU. Once stable in PACU, pt was brought to the floor. During hospital stay pt was followed by Medicine,  during this admission. Pt hospital course was unremarkable. Pt is stable for discharge to home on POD# 1

## 2025-07-17 NOTE — ASU PREOP CHECKLIST - COMMENTS
except PO med with sip of water at 0100 except PO med with sip of water at 0100 and PO Boost (from PST )at 0530 this AM

## 2025-07-17 NOTE — OCCUPATIONAL THERAPY INITIAL EVALUATION ADULT - GENERAL OBSERVATIONS, REHAB EVAL
Chart reviewed. Pt encountered semi-supine in bed, NAD, +IV heplock, +b/l SCDs, +ace wrap to R knee clean and intact. A&Ox4.Wife at bedside. Pt agreeable to OT laz.

## 2025-07-17 NOTE — PHYSICAL THERAPY INITIAL EVALUATION ADULT - LEVEL OF CONSCIOUSNESS, REHAB EVAL
Calls with update of BP readings from last week and this weekend. Pt had called on Friday, I had attempted to call the Pt back and get the readings at the time but they were not home      9/24 all medication had been administered 2 hours prior to taking vitals  122/77,56  110/77,54    At 1730 only hydralazine and lasix were given because BP was too low  83/50,55    At 2130  131/75,60    On 9/25 at 1300, no medication was held  152/89,72    At 2230  127/65,84. Wife advises that the second dose of metoprolol was held due to fatigue    9/27 no medication was held  1200  116/64,62    9/28 no medication was held  1200   132/70,59    0430 Pt was awake could not sleep  78/49,79    On call was called at this time and Pt was advised to hold lasix, hydralazine and metoprolol until today and contact the office    BP last night  177/91, 68  172/88, 88    This morning at 0400  126/62,64    Recommendations?   Asa 325 mg QD  Lasix 40 mg BID, held since Saturday  Hydralazine 25 mg TID, held since Saturday  Metoprolol 12.5 mg BID, held since Saturday   Potassium 40 mEq BID  Tamsulosin 0.4mg QD, held since Saturday      alert

## 2025-07-17 NOTE — DISCHARGE NOTE PROVIDER - NSDCFUSCHEDAPPT_GEN_ALL_CORE_FT
Germain Spencer  [facility]  [address]   Scheduled Appointment: 07/17/2025    Germain Spencer  [facility]  [address]  Scheduled Appointment: 08/01/2025     Germain Spencer  [facility] Physician [facility]  [address]  Scheduled Appointment: 08/01/2025

## 2025-07-17 NOTE — PHYSICAL THERAPY INITIAL EVALUATION ADULT - MANUAL MUSCLE TESTING RESULTS, REHAB EVAL
R Knee decreased strength secondary to post surgical pain and inflammation. 3+/5 R Knee/grossly assessed due to

## 2025-07-17 NOTE — CONSULT NOTE ADULT - SUBJECTIVE AND OBJECTIVE BOX
ANA LUISA DURÁN is a 75y Male s/p ROBOTIC ASSISTED RIGHT TOTAL KNEE ARTHROPLASTY WITH VELYS      w/ h/o HTN (hypertension)    Hypothyroid    CAD (coronary artery disease)    Pacemaker      denies any chest pain shortness of breath palpitation dizziness lightheadedness nausea vomiting fever or chills    S/P primary angioplasty    Pacemaker        SH: doesnot smoke or drink at this time    No Known Allergies    acetaminophen     Tablet .. 1000 milliGRAM(s) Oral every 8 hours  acetaminophen   IVPB .. 1000 milliGRAM(s) IV Intermittent once  ascorbic acid 500 milliGRAM(s) Oral two times a day  ceFAZolin   IVPB 2000 milliGRAM(s) IV Intermittent every 8 hours  HYDROmorphone  Injectable 0.5 milliGRAM(s) IV Push every 3 hours PRN  ketorolac   Injectable 30 milliGRAM(s) IV Push every 8 hours  levothyroxine 50 MICROGram(s) Oral daily  lisinopril 10 milliGRAM(s) Oral daily  magnesium hydroxide Suspension 30 milliLiter(s) Oral daily PRN  metoprolol succinate  milliGRAM(s) Oral daily  multivitamin 1 Tablet(s) Oral daily  ondansetron Injectable 4 milliGRAM(s) IV Push every 6 hours PRN  oxyCODONE    IR 5 milliGRAM(s) Oral every 3 hours PRN  oxyCODONE    IR 10 milliGRAM(s) Oral every 3 hours PRN  oxyCODONE    IR 15 milliGRAM(s) Oral every 4 hours PRN  pantoprazole    Tablet 40 milliGRAM(s) Oral before breakfast  polyethylene glycol 3350 17 Gram(s) Oral at bedtime  rosuvastatin 20 milliGRAM(s) Oral at bedtime  senna 2 Tablet(s) Oral at bedtime  sodium chloride 0.9%. 1000 milliLiter(s) IV Continuous <Continuous>    T(C): 36.4 (07-17-25 @ 21:10), Max: 36.9 (07-17-25 @ 18:41)  HR: 60 (07-17-25 @ 21:10) (54 - 83)  BP: 144/74 (07-17-25 @ 21:10) (111/68 - 144/74)  RR: 18 (07-17-25 @ 21:10) (13 - 18)  SpO2: 97% (07-17-25 @ 21:10) (94% - 100%)  HEENT unremarkable  neck no JVD or bruit  heart normal S1 S2 RRR no gallops or rubs  chest clear to auscultation  abd sof nontender non distended +bs  ext no calf tenderness    A/P   DVT PX  pain control  bowel regimen   wound care as per ortho  GI PX  antiemetics prn  incentive spirometer

## 2025-07-17 NOTE — PHYSICAL THERAPY INITIAL EVALUATION ADULT - GAIT DEVIATIONS NOTED, PT EVAL
decreased shawn/increased time in double stance/decreased velocity of limb motion/decreased step length/decreased stride length/decreased weight-shifting ability

## 2025-07-18 ENCOUNTER — TRANSCRIPTION ENCOUNTER (OUTPATIENT)
Age: 75
End: 2025-07-18

## 2025-07-18 VITALS
HEART RATE: 59 BPM | RESPIRATION RATE: 18 BRPM | DIASTOLIC BLOOD PRESSURE: 69 MMHG | SYSTOLIC BLOOD PRESSURE: 118 MMHG | TEMPERATURE: 98 F | OXYGEN SATURATION: 97 %

## 2025-07-18 PROBLEM — I10 ESSENTIAL (PRIMARY) HYPERTENSION: Chronic | Status: ACTIVE | Noted: 2025-06-06

## 2025-07-18 PROBLEM — I25.10 ATHEROSCLEROTIC HEART DISEASE OF NATIVE CORONARY ARTERY WITHOUT ANGINA PECTORIS: Chronic | Status: ACTIVE | Noted: 2025-06-06

## 2025-07-18 PROBLEM — Z95.0 PRESENCE OF CARDIAC PACEMAKER: Chronic | Status: ACTIVE | Noted: 2025-06-06

## 2025-07-18 LAB
ANION GAP SERPL CALC-SCNC: 8 MMOL/L — SIGNIFICANT CHANGE UP (ref 5–17)
BUN SERPL-MCNC: 26 MG/DL — HIGH (ref 7–23)
CALCIUM SERPL-MCNC: 7.9 MG/DL — LOW (ref 8.5–10.1)
CHLORIDE SERPL-SCNC: 112 MMOL/L — HIGH (ref 96–108)
CO2 SERPL-SCNC: 20 MMOL/L — LOW (ref 22–31)
CREAT SERPL-MCNC: 1.21 MG/DL — SIGNIFICANT CHANGE UP (ref 0.5–1.3)
EGFR: 62 ML/MIN/1.73M2 — SIGNIFICANT CHANGE UP
EGFR: 62 ML/MIN/1.73M2 — SIGNIFICANT CHANGE UP
GLUCOSE SERPL-MCNC: 126 MG/DL — HIGH (ref 70–99)
HCT VFR BLD CALC: 32.4 % — LOW (ref 39–50)
HGB BLD-MCNC: 11.2 G/DL — LOW (ref 13–17)
MCHC RBC-ENTMCNC: 31.6 PG — SIGNIFICANT CHANGE UP (ref 27–34)
MCHC RBC-ENTMCNC: 34.6 G/DL — SIGNIFICANT CHANGE UP (ref 32–36)
MCV RBC AUTO: 91.5 FL — SIGNIFICANT CHANGE UP (ref 80–100)
NRBC BLD AUTO-RTO: 0 /100 WBCS — SIGNIFICANT CHANGE UP (ref 0–0)
PLATELET # BLD AUTO: 114 K/UL — LOW (ref 150–400)
POTASSIUM SERPL-MCNC: 4.1 MMOL/L — SIGNIFICANT CHANGE UP (ref 3.5–5.3)
POTASSIUM SERPL-SCNC: 4.1 MMOL/L — SIGNIFICANT CHANGE UP (ref 3.5–5.3)
RBC # BLD: 3.54 M/UL — LOW (ref 4.2–5.8)
RBC # FLD: 13 % — SIGNIFICANT CHANGE UP (ref 10.3–14.5)
SODIUM SERPL-SCNC: 140 MMOL/L — SIGNIFICANT CHANGE UP (ref 135–145)
WBC # BLD: 9.97 K/UL — SIGNIFICANT CHANGE UP (ref 3.8–10.5)
WBC # FLD AUTO: 9.97 K/UL — SIGNIFICANT CHANGE UP (ref 3.8–10.5)

## 2025-07-18 RX ORDER — ACETAMINOPHEN 500 MG/5ML
2 LIQUID (ML) ORAL
Qty: 0 | Refills: 0 | DISCHARGE
Start: 2025-07-18

## 2025-07-18 RX ORDER — OXYCODONE HYDROCHLORIDE 30 MG/1
1 TABLET ORAL
Qty: 42 | Refills: 0
Start: 2025-07-18 | End: 2025-07-24

## 2025-07-18 RX ORDER — ASPIRIN 325 MG
1 TABLET ORAL
Qty: 0 | Refills: 0 | DISCHARGE
Start: 2025-07-18

## 2025-07-18 RX ORDER — POLYETHYLENE GLYCOL 3350 17 G/17G
17 POWDER, FOR SOLUTION ORAL
Qty: 0 | Refills: 0 | DISCHARGE
Start: 2025-07-18

## 2025-07-18 RX ORDER — NALOXONE HYDROCHLORIDE 0.4 MG/ML
4 INJECTION, SOLUTION INTRAMUSCULAR; INTRAVENOUS; SUBCUTANEOUS
Qty: 1 | Refills: 0
Start: 2025-07-18 | End: 2025-07-18

## 2025-07-18 RX ORDER — SENNA 187 MG
2 TABLET ORAL
Qty: 0 | Refills: 0 | DISCHARGE
Start: 2025-07-18

## 2025-07-18 RX ORDER — B1/B2/B3/B5/B6/B12/VIT C/FOLIC 500-0.5 MG
1 TABLET ORAL
Qty: 0 | Refills: 0 | DISCHARGE
Start: 2025-07-18

## 2025-07-18 RX ORDER — CELECOXIB 50 MG/1
1 CAPSULE ORAL
Qty: 60 | Refills: 0
Start: 2025-07-18 | End: 2025-08-16

## 2025-07-18 RX ADMIN — Medication 500 MILLIGRAM(S): at 05:29

## 2025-07-18 RX ADMIN — Medication 50 MICROGRAM(S): at 05:29

## 2025-07-18 RX ADMIN — Medication 81 MILLIGRAM(S): at 05:29

## 2025-07-18 RX ADMIN — Medication 100 MILLIGRAM(S): at 01:04

## 2025-07-18 RX ADMIN — CELECOXIB 200 MILLIGRAM(S): 50 CAPSULE ORAL at 06:28

## 2025-07-18 RX ADMIN — Medication 150 MILLILITER(S): at 04:04

## 2025-07-18 RX ADMIN — KETOROLAC TROMETHAMINE 30 MILLIGRAM(S): 30 INJECTION, SOLUTION INTRAMUSCULAR; INTRAVENOUS at 05:04

## 2025-07-18 RX ADMIN — Medication 1000 MILLIGRAM(S): at 05:28

## 2025-07-18 RX ADMIN — KETOROLAC TROMETHAMINE 30 MILLIGRAM(S): 30 INJECTION, SOLUTION INTRAMUSCULAR; INTRAVENOUS at 04:04

## 2025-07-18 RX ADMIN — DEXAMETHASONE 102 MILLIGRAM(S): 0.5 TABLET ORAL at 05:31

## 2025-07-18 RX ADMIN — Medication 40 MILLIGRAM(S): at 05:29

## 2025-07-18 RX ADMIN — Medication 1000 MILLIGRAM(S): at 06:28

## 2025-07-18 RX ADMIN — CELECOXIB 200 MILLIGRAM(S): 50 CAPSULE ORAL at 05:29

## 2025-07-18 NOTE — DISCHARGE NOTE NURSING/CASE MANAGEMENT/SOCIAL WORK - NSDCFUADDAPPT_GEN_ALL_CORE_FT

## 2025-07-18 NOTE — PROGRESS NOTE ADULT - SUBJECTIVE AND OBJECTIVE BOX
Patient is seen and examined at bedside. Denies CP/SOB/Dizziness/N/V/D/HA. Pain is controlled.     Vital Signs Last 24 Hrs  T(C): 36.5 (18 Jul 2025 08:41), Max: 36.9 (17 Jul 2025 18:41)  T(F): 97.7 (18 Jul 2025 08:41), Max: 98.5 (17 Jul 2025 18:41)  HR: 60 (18 Jul 2025 08:41) (54 - 64)  BP: 144/79 (18 Jul 2025 08:41) (111/68 - 144/79)  BP(mean): 85 (17 Jul 2025 11:14) (82 - 85)  RR: 18 (18 Jul 2025 08:41) (13 - 18)  SpO2: 96% (18 Jul 2025 08:41) (94% - 100%)    Parameters below as of 18 Jul 2025 05:00  Patient On (Oxygen Delivery Method): room air          PHYSICAL EXAM:  General: NAD  Neuro:  Alert & responsive  HEENT: NCAT, EOMI, conjunctiva clear  abd: soft, NT/ND   Right LE: Prineo dressing C/D/I. Motor intact + EHL/FHL/TA/GS.  Sensation is grossly intact.  Extremity warm, compartments soft, compressible. No calf tenderness. DP 2+   Left LE: Motor intact +EHL/FHL/TA/GS. Sensation is grossly intact. Extremity warm, compartments soft, compressible. No calf tenderness. DP2+    Labs:                          11.2   9.97  )-----------( 114      ( 18 Jul 2025 06:15 )             32.4       07-18    140  |  112[H]  |  26[H]  ----------------------------<  126[H]  4.1   |  20[L]  |  1.21    Ca    7.9[L]      18 Jul 2025 06:15        A/P: Patient is a 75y y/o Male s/p right TKA, POD # 1  -wound care, knee extension/leg elevation, cryocuff, isometric exercises, new medications reviewed with pt  -Pain control/analgesia reviewed   -Inc spirometry reviewed with pt, demonstrated competence  -DVT prophylaxis with Venodynes/Aspirin 81mg BID  -Pt requires a rolling walker for MRADLs at home  -PT/OT/WBAT  -medical consult reviewed   -DC planning: for home today with home care  -D/W Dr Spencer    
ANA LUISA DURÁN is a 75y Male s/p ROBOTIC ASSISTED RIGHT TOTAL KNEE ARTHROPLASTY WITH VELYS        denies any chest pain shortness of breath palpitation dizziness lightheadedness nausea vomiting fever or chills    T(C): 36.5 (07-18-25 @ 10:33), Max: 36.9 (07-17-25 @ 18:41)  HR: 59 (07-18-25 @ 10:33) (54 - 64)  BP: 118/69 (07-18-25 @ 10:33) (114/73 - 144/79)  RR: 18 (07-18-25 @ 10:33) (16 - 18)  SpO2: 97% (07-18-25 @ 10:33) (94% - 98%)  no jvd/bruit  s1 s2 rrr  cta  s/nt/nd  no calf tend                        11.2   9.97  )-----------( 114      ( 18 Jul 2025 06:15 )             32.4   07-18    140  |  112[H]  |  26[H]  ----------------------------<  126[H]  4.1   |  20[L]  |  1.21    Ca    7.9[L]      18 Jul 2025 06:15        cont dvt px  pain control  bowel regimen  antiemetics  incentive spirometer
Patient is seen and examined at bedside. Denies CP/SOB/Dizziness/N/V/D/HA. Pain is controlled.     Vital Signs Last 24 Hrs  T(C): 36.8 (17 Jul 2025 13:41), Max: 36.8 (17 Jul 2025 13:41)  T(F): 98.3 (17 Jul 2025 13:41), Max: 98.3 (17 Jul 2025 13:41)  HR: 58 (17 Jul 2025 13:41) (58 - 83)  BP: 114/73 (17 Jul 2025 13:41) (111/68 - 126/79)  BP(mean): 85 (17 Jul 2025 11:14) (82 - 85)  RR: 16 (17 Jul 2025 13:41) (13 - 18)  SpO2: 94% (17 Jul 2025 13:41) (94% - 100%)    Parameters below as of 17 Jul 2025 13:41  Patient On (Oxygen Delivery Method): room air          PHYSICAL EXAM:  General: NAD  Neuro:  Alert & responsive  HEENT: NCAT, EOMI, conjunctiva clear  abd: soft, NT/ND  Right LE: ACE bandage C/D/I. Motor intact + EHL/FHL/TA/GS.  Sensation is grossly intact.  Extremity warm, compartments soft, compressible. No calf tenderness. DP 2+   Left LE: Motor intact +EHL/FHL/TA/GS. Sensation is grossly intact. Extremity warm, compartments soft, compressible. No calf tenderness. DP2+    Labs:                          12.1   6.38  )-----------( 118      ( 17 Jul 2025 10:52 )             35.7       07-17    138  |  111[H]  |  21  ----------------------------<  119[H]  3.6   |  21[L]  |  1.08    Ca    8.4[L]      17 Jul 2025 10:52        A/P: Patient is a 75y y/o Male s/p right TKA, POD #0  -wound care, knee extension/leg elevation, cryocuff, isometric exercises, new medications reviewed with pt  -Pain control/analgesia  -Inc spirometry reviewed with pt, demonstrated competence  -DVT prophylaxis with Venodynes/Aspirin 81mg BID  -Pt requires a rolling walker for MRADLs at home  -F/U AM labs  -PT/OT/WBAT  -complete prophylactic Antibiotic  -medical consult pending   -DC planning: for home tomorrow

## 2025-07-18 NOTE — DISCHARGE NOTE NURSING/CASE MANAGEMENT/SOCIAL WORK - PATIENT PORTAL LINK FT
You can access the FollowMyHealth Patient Portal offered by [facility] by registering at the following website: [email or web address]. By joining [employer]’s FollowMyHealth portal, you will also be able to view your health information using other applications (apps) compatible with our system.

## 2025-07-18 NOTE — DISCHARGE NOTE NURSING/CASE MANAGEMENT/SOCIAL WORK - FINANCIAL ASSISTANCE
[facility] provides services at a reduced cost to those who are determined to be eligible through [facility]’s financial assistance program. Information regarding [facility]’s financial assistance program can be found by going to [email or web address] or by calling [phone].

## 2025-07-22 LAB — SURGICAL PATHOLOGY STUDY: SIGNIFICANT CHANGE UP

## 2025-07-28 ENCOUNTER — APPOINTMENT (OUTPATIENT)
Dept: ORTHOPEDIC SURGERY | Facility: CLINIC | Age: 75
End: 2025-07-28
Payer: MEDICARE

## 2025-07-28 DIAGNOSIS — Z79.890 HORMONE REPLACEMENT THERAPY: ICD-10-CM

## 2025-07-28 DIAGNOSIS — E03.9 HYPOTHYROIDISM, UNSPECIFIED: ICD-10-CM

## 2025-07-28 DIAGNOSIS — Z96.651 PRESENCE OF RIGHT ARTIFICIAL KNEE JOINT: ICD-10-CM

## 2025-07-28 DIAGNOSIS — I10 ESSENTIAL (PRIMARY) HYPERTENSION: ICD-10-CM

## 2025-07-28 DIAGNOSIS — Z95.0 PRESENCE OF CARDIAC PACEMAKER: ICD-10-CM

## 2025-07-28 DIAGNOSIS — M17.11 UNILATERAL PRIMARY OSTEOARTHRITIS, RIGHT KNEE: ICD-10-CM

## 2025-07-28 DIAGNOSIS — I25.10 ATHEROSCLEROTIC HEART DISEASE OF NATIVE CORONARY ARTERY WITHOUT ANGINA PECTORIS: ICD-10-CM

## 2025-07-28 DIAGNOSIS — Z79.82 LONG TERM (CURRENT) USE OF ASPIRIN: ICD-10-CM

## 2025-07-28 PROCEDURE — 73562 X-RAY EXAM OF KNEE 3: CPT | Mod: RT

## 2025-07-28 PROCEDURE — 99024 POSTOP FOLLOW-UP VISIT: CPT

## 2025-07-28 RX ORDER — METHYLPREDNISOLONE 4 MG/1
4 TABLET ORAL
Qty: 1 | Refills: 0 | Status: ACTIVE | COMMUNITY
Start: 2025-07-28 | End: 1900-01-01

## 2025-08-01 ENCOUNTER — APPOINTMENT (OUTPATIENT)
Dept: ORTHOPEDIC SURGERY | Facility: CLINIC | Age: 75
End: 2025-08-01

## 2025-08-11 ENCOUNTER — APPOINTMENT (OUTPATIENT)
Dept: ORTHOPEDIC SURGERY | Facility: CLINIC | Age: 75
End: 2025-08-11
Payer: MEDICARE

## 2025-08-11 DIAGNOSIS — Z96.659 PRESENCE OF UNSPECIFIED ARTIFICIAL KNEE JOINT: ICD-10-CM

## 2025-08-11 PROCEDURE — 99214 OFFICE O/P EST MOD 30 MIN: CPT

## 2025-08-26 ENCOUNTER — APPOINTMENT (OUTPATIENT)
Dept: ORTHOPEDIC SURGERY | Facility: CLINIC | Age: 75
End: 2025-08-26
Payer: MEDICARE

## 2025-08-26 VITALS — BODY MASS INDEX: 29.86 KG/M2 | HEIGHT: 68 IN | WEIGHT: 197 LBS

## 2025-08-26 DIAGNOSIS — M17.12 UNILATERAL PRIMARY OSTEOARTHRITIS, LEFT KNEE: ICD-10-CM

## 2025-08-26 DIAGNOSIS — Z00.00 ENCOUNTER FOR GENERAL ADULT MEDICAL EXAMINATION W/OUT ABNORMAL FINDINGS: ICD-10-CM

## 2025-08-26 DIAGNOSIS — Z96.651 PRESENCE OF RIGHT ARTIFICIAL KNEE JOINT: ICD-10-CM

## 2025-08-26 PROCEDURE — 73562 X-RAY EXAM OF KNEE 3: CPT | Mod: RT

## 2025-08-26 PROCEDURE — 99024 POSTOP FOLLOW-UP VISIT: CPT

## 2025-08-26 PROCEDURE — 20611 DRAIN/INJ JOINT/BURSA W/US: CPT | Mod: 79,LT

## 2025-08-28 ENCOUNTER — NON-APPOINTMENT (OUTPATIENT)
Age: 75
End: 2025-08-28

## 2025-09-02 ENCOUNTER — APPOINTMENT (OUTPATIENT)
Dept: ORTHOPEDIC SURGERY | Facility: CLINIC | Age: 75
End: 2025-09-02

## 2025-09-04 ENCOUNTER — NON-APPOINTMENT (OUTPATIENT)
Age: 75
End: 2025-09-04

## 2025-09-09 ENCOUNTER — APPOINTMENT (OUTPATIENT)
Dept: ORTHOPEDIC SURGERY | Facility: CLINIC | Age: 75
End: 2025-09-09

## 2025-09-09 VITALS — WEIGHT: 197 LBS | HEIGHT: 68 IN | BODY MASS INDEX: 29.86 KG/M2

## 2025-09-09 DIAGNOSIS — M17.12 UNILATERAL PRIMARY OSTEOARTHRITIS, LEFT KNEE: ICD-10-CM

## (undated) DEVICE — HOOD T5 PEELAWAY

## (undated) DEVICE — GOWN IMPERV BREATHABLE XL

## (undated) DEVICE — SOL IRR BAG NS 0.9% 3000ML

## (undated) DEVICE — ZIMMER PULSAVAC PLUS FAN KIT

## (undated) DEVICE — SUT STRATAFIX SYMMETRIC PDS PLUS 1 18" CTX VIOLET

## (undated) DEVICE — POSITIONER FOAM BUMP FLAT TOP 10X6X4" LRG

## (undated) DEVICE — SAW BLADE STRYKER SAGITTAL 25X0.89X75MM

## (undated) DEVICE — WARMING BLANKET UPPER ADULT

## (undated) DEVICE — MAKO CHECKPOINT KIT FEMORAL / TIBIAL

## (undated) DEVICE — MEDICATION LABELS W MARKER

## (undated) DEVICE — FRA-ESU BOVIE FORCE TRIAD T6D04777DX: Type: DURABLE MEDICAL EQUIPMENT

## (undated) DEVICE — DRSG COBAN 6"

## (undated) DEVICE — SUCTION TIP KAMVAC MINI

## (undated) DEVICE — Device

## (undated) DEVICE — DRAPE SURGICAL #1010

## (undated) DEVICE — SYR ASEPTO

## (undated) DEVICE — PACK TOTAL KNEE

## (undated) DEVICE — SUT STRATAFIX SPIRAL MONOCRYL PLUS 4-0 45CM PS-2 UNDYED

## (undated) DEVICE — SUT STRATAFIX SPIRAL PDS PLUS 2-0 45CM CT-1

## (undated) DEVICE — HOOD FLYTE STRYKER HELMET SHIELD

## (undated) DEVICE — MAKO VIZADISC KNEE TRACKING KIT

## (undated) DEVICE — MIXER BONE CEMENT EVAC III

## (undated) DEVICE — NDL HYPO SAFE 22G X 1.5" (BLACK)

## (undated) DEVICE — CRYO/CUFF GRAVITY COOLER KNEE LARGE

## (undated) DEVICE — SYR LUER LOK 20CC

## (undated) DEVICE — MAKO BLADE NARROW

## (undated) DEVICE — ELCTR STRYKER NEPTUNE SMOKE EVACUATION PENCIL (GREEN)

## (undated) DEVICE — PREP SCRUB BRUSH W CHG 4%

## (undated) DEVICE — GLV 8.5 PROTEXIS ORTHO (BROWN)

## (undated) DEVICE — DRSG TELFA 3 X 8

## (undated) DEVICE — ELCTR GROUNDING PAD ADULT COVIDIEN

## (undated) DEVICE — MAKO BLADE STANDARD

## (undated) DEVICE — DRSG DERMABOND PRINEO 22CM

## (undated) DEVICE — VENODYNE/SCD SLEEVE CALF MEDIUM

## (undated) DEVICE — TOURNIQUET ESMARK 6"

## (undated) DEVICE — SUT VICRYL 1 36" CT-1 UNDYED

## (undated) DEVICE — DRSG ACE BANDAGE 6"